# Patient Record
Sex: FEMALE | URBAN - METROPOLITAN AREA
[De-identification: names, ages, dates, MRNs, and addresses within clinical notes are randomized per-mention and may not be internally consistent; named-entity substitution may affect disease eponyms.]

---

## 2024-02-06 LAB
EXTERNAL CHLAMYDIA SCREEN: NEGATIVE
EXTERNAL GONORRHEA SCREEN: NEGATIVE
EXTERNAL HEPATITIS B SURFACE ANTIGEN: NEGATIVE
EXTERNAL RUBELLA IGG QUANTITATION: POSITIVE
HIV 1/ 2 AG/AB SCREEN EXTERNAL: NEGATIVE

## 2024-05-30 LAB
GLUCOSE, 1 HR SCREEN, PREG EXTERNAL: 132 MG/DL
SYPHILIS TOTAL AB EXTERNAL: NEGATIVE

## 2024-06-16 ENCOUNTER — HOSPITAL ENCOUNTER (EMERGENCY)
Facility: HOSPITAL | Age: 24
Discharge: STILL ADMITTED OR ADMINISTRATIVE | End: 2024-06-16

## 2024-06-16 ENCOUNTER — HOSPITAL ENCOUNTER (OUTPATIENT)
Facility: HOSPITAL | Age: 24
Discharge: HOME | End: 2024-06-16
Attending: STUDENT IN AN ORGANIZED HEALTH CARE EDUCATION/TRAINING PROGRAM | Admitting: STUDENT IN AN ORGANIZED HEALTH CARE EDUCATION/TRAINING PROGRAM

## 2024-06-16 ENCOUNTER — HOSPITAL ENCOUNTER (OUTPATIENT)
Facility: HOSPITAL | Age: 24
End: 2024-06-16
Attending: STUDENT IN AN ORGANIZED HEALTH CARE EDUCATION/TRAINING PROGRAM | Admitting: STUDENT IN AN ORGANIZED HEALTH CARE EDUCATION/TRAINING PROGRAM

## 2024-06-16 VITALS
RESPIRATION RATE: 20 BRPM | BODY MASS INDEX: 29.16 KG/M2 | SYSTOLIC BLOOD PRESSURE: 102 MMHG | HEIGHT: 65 IN | WEIGHT: 175 LBS | DIASTOLIC BLOOD PRESSURE: 58 MMHG | OXYGEN SATURATION: 97 % | TEMPERATURE: 98.4 F | HEART RATE: 94 BPM

## 2024-06-16 LAB
ALBUMIN SERPL-MCNC: 3.2 G/DL (ref 3.5–5)
ALP BLD-CCNC: 123 U/L (ref 35–125)
ALT SERPL-CCNC: 21 U/L (ref 5–40)
ANION GAP SERPL CALC-SCNC: 11 MMOL/L
AST SERPL-CCNC: 40 U/L (ref 5–40)
BASOPHILS # BLD AUTO: 0.03 X10*3/UL (ref 0–0.1)
BASOPHILS NFR BLD AUTO: 0.2 %
BILIRUB SERPL-MCNC: 0.3 MG/DL (ref 0.1–1.2)
BILIRUBIN, POC: NEGATIVE
BLOOD URINE, POC: NEGATIVE
BUN SERPL-MCNC: 6 MG/DL (ref 8–25)
CALCIUM SERPL-MCNC: 8.7 MG/DL (ref 8.5–10.4)
CHLORIDE SERPL-SCNC: 102 MMOL/L (ref 97–107)
CLARITY, POC: CLEAR
CO2 SERPL-SCNC: 21 MMOL/L (ref 24–31)
COLOR, POC: YELLOW
CREAT SERPL-MCNC: 0.4 MG/DL (ref 0.4–1.6)
EGFRCR SERPLBLD CKD-EPI 2021: >90 ML/MIN/1.73M*2
EOSINOPHIL # BLD AUTO: 0.15 X10*3/UL (ref 0–0.7)
EOSINOPHIL NFR BLD AUTO: 1.1 %
ERYTHROCYTE [DISTWIDTH] IN BLOOD BY AUTOMATED COUNT: 12.6 % (ref 11.5–14.5)
GLUCOSE SERPL-MCNC: 100 MG/DL (ref 65–99)
GLUCOSE URINE, POC: NORMAL
HCT VFR BLD AUTO: 34.8 % (ref 36–46)
HGB BLD-MCNC: 11.6 G/DL (ref 12–16)
IMM GRANULOCYTES # BLD AUTO: 0.15 X10*3/UL (ref 0–0.7)
IMM GRANULOCYTES NFR BLD AUTO: 1.1 % (ref 0–0.9)
KETONES, POC: NEGATIVE
LEUKOCYTE EST, POC: NEGATIVE
LYMPHOCYTES # BLD AUTO: 1.99 X10*3/UL (ref 1.2–4.8)
LYMPHOCYTES NFR BLD AUTO: 13.9 %
MCH RBC QN AUTO: 29.5 PG (ref 26–34)
MCHC RBC AUTO-ENTMCNC: 33.3 G/DL (ref 32–36)
MCV RBC AUTO: 89 FL (ref 80–100)
MONOCYTES # BLD AUTO: 0.91 X10*3/UL (ref 0.1–1)
MONOCYTES NFR BLD AUTO: 6.4 %
NEUTROPHILS # BLD AUTO: 11.04 X10*3/UL (ref 1.2–7.7)
NEUTROPHILS NFR BLD AUTO: 77.3 %
NITRITE, POC: NEGATIVE
NRBC BLD-RTO: 0 /100 WBCS (ref 0–0)
PH, POC: 7
PLATELET # BLD AUTO: 270 X10*3/UL (ref 150–450)
POC APPEARANCE OF BODY FLUID: NORMAL
POTASSIUM SERPL-SCNC: 3.1 MMOL/L (ref 3.4–5.1)
PROT SERPL-MCNC: 6.4 G/DL (ref 5.9–7.9)
RBC # BLD AUTO: 3.93 X10*6/UL (ref 4–5.2)
SODIUM SERPL-SCNC: 134 MMOL/L (ref 133–145)
SPECIFIC GRAVITY, POC: 1.01
URINE PROTEIN, POC: NORMAL
UROBILINOGEN, POC: NEGATIVE
WBC # BLD AUTO: 14.3 X10*3/UL (ref 4.4–11.3)

## 2024-06-16 PROCEDURE — 99213 OFFICE O/P EST LOW 20 MIN: CPT

## 2024-06-16 PROCEDURE — 2500000005 HC RX 250 GENERAL PHARMACY W/O HCPCS: Performed by: STUDENT IN AN ORGANIZED HEALTH CARE EDUCATION/TRAINING PROGRAM

## 2024-06-16 PROCEDURE — 4500999001 HC ED NO CHARGE

## 2024-06-16 PROCEDURE — 85025 COMPLETE CBC W/AUTO DIFF WBC: CPT | Performed by: STUDENT IN AN ORGANIZED HEALTH CARE EDUCATION/TRAINING PROGRAM

## 2024-06-16 PROCEDURE — 80053 COMPREHEN METABOLIC PANEL: CPT | Performed by: STUDENT IN AN ORGANIZED HEALTH CARE EDUCATION/TRAINING PROGRAM

## 2024-06-16 PROCEDURE — 36415 COLL VENOUS BLD VENIPUNCTURE: CPT | Performed by: STUDENT IN AN ORGANIZED HEALTH CARE EDUCATION/TRAINING PROGRAM

## 2024-06-16 RX ORDER — ONDANSETRON HYDROCHLORIDE 2 MG/ML
4 INJECTION, SOLUTION INTRAVENOUS EVERY 6 HOURS PRN
Status: DISCONTINUED | OUTPATIENT
Start: 2024-06-16 | End: 2024-06-17 | Stop reason: HOSPADM

## 2024-06-16 RX ORDER — ONDANSETRON 4 MG/1
4 TABLET, FILM COATED ORAL EVERY 6 HOURS PRN
Status: DISCONTINUED | OUTPATIENT
Start: 2024-06-16 | End: 2024-06-17 | Stop reason: HOSPADM

## 2024-06-16 RX ORDER — LABETALOL HYDROCHLORIDE 5 MG/ML
20 INJECTION, SOLUTION INTRAVENOUS ONCE AS NEEDED
Status: DISCONTINUED | OUTPATIENT
Start: 2024-06-16 | End: 2024-06-17 | Stop reason: HOSPADM

## 2024-06-16 RX ORDER — SODIUM CHLORIDE, SODIUM LACTATE, POTASSIUM CHLORIDE, CALCIUM CHLORIDE 600; 310; 30; 20 MG/100ML; MG/100ML; MG/100ML; MG/100ML
125 INJECTION, SOLUTION INTRAVENOUS CONTINUOUS
Status: DISCONTINUED | OUTPATIENT
Start: 2024-06-16 | End: 2024-06-17 | Stop reason: HOSPADM

## 2024-06-16 RX ORDER — NIFEDIPINE 10 MG/1
10 CAPSULE ORAL ONCE AS NEEDED
Status: DISCONTINUED | OUTPATIENT
Start: 2024-06-16 | End: 2024-06-17 | Stop reason: HOSPADM

## 2024-06-16 RX ORDER — ONDANSETRON 4 MG/1
4 TABLET, FILM COATED ORAL EVERY 8 HOURS PRN
Status: DISCONTINUED | OUTPATIENT
Start: 2024-06-16 | End: 2024-06-17 | Stop reason: HOSPADM

## 2024-06-16 RX ORDER — LIDOCAINE HYDROCHLORIDE 10 MG/ML
0.5 INJECTION INFILTRATION; PERINEURAL ONCE AS NEEDED
Status: DISCONTINUED | OUTPATIENT
Start: 2024-06-16 | End: 2024-06-17 | Stop reason: HOSPADM

## 2024-06-16 RX ORDER — HYDRALAZINE HYDROCHLORIDE 20 MG/ML
5 INJECTION INTRAMUSCULAR; INTRAVENOUS ONCE AS NEEDED
Status: DISCONTINUED | OUTPATIENT
Start: 2024-06-16 | End: 2024-06-17 | Stop reason: HOSPADM

## 2024-06-16 RX ORDER — ACETAMINOPHEN 325 MG/1
975 TABLET ORAL ONCE
Status: COMPLETED | OUTPATIENT
Start: 2024-06-16 | End: 2024-06-16

## 2024-06-16 ASSESSMENT — PAIN DESCRIPTION - DESCRIPTORS
DESCRIPTORS: SHARP
DESCRIPTORS: SHARP

## 2024-06-16 ASSESSMENT — PAIN DESCRIPTION - LOCATION: LOCATION: ABDOMEN

## 2024-06-16 ASSESSMENT — PAIN SCALES - GENERAL
PAINLEVEL_OUTOF10: 3
PAINLEVEL_OUTOF10: 10 - WORST POSSIBLE PAIN

## 2024-06-16 ASSESSMENT — PAIN - FUNCTIONAL ASSESSMENT
PAIN_FUNCTIONAL_ASSESSMENT: 0-10
PAIN_FUNCTIONAL_ASSESSMENT: 0-10

## 2024-06-17 NOTE — H&P
Obstetrical Admission History and Physical     Kyung De La Garza is a 23 y.o. . At 30+3 presents c/o one hour of upper abd pain.    Chief Complaint: Abdominal Pain and Vaginal Bleeding    Assessment/Plan    Pt feeling better after zofran and tylenol po. Declined IVF.  Tolerating water po.  Reviewed labs with pt, pt desires discharge home.      Active Problems:  There are no active Hospital Problems.      Pregnancy Problems (from 26 to present)       No problems associated with this episode.          Subjective   Kyung Dodge is here complaining of upper abd pain that started about an hour ago.  Pt ate a cheeseburger for dinner, then had pain and emesis.   Good FM, no VB, no LOF, no lower abd cramping.  Pain is in upper abd and constant.  Reports uncomplicated pregnancy.  Was seen at [facility] and just transferred and had one visit with LOG     Obstetrical History   OB History    Para Term  AB Living   1             SAB IAB Ectopic Multiple Live Births                  # Outcome Date GA Lbr [place]/2nd Weight Sex Delivery Anes PTL Lv   1 Current                Past Medical History  No past medical history on file.     Past Surgical History   No past surgical history on file.    Social History  Social History     Tobacco Use    Smoking status: Not on file    Smokeless tobacco: Not on file   Substance Use Topics    Alcohol use: Not on file     Substance and Sexual Activity   Drug Use Not on file       Allergies  Patient has no known allergies.     Medications  Medications Prior to Admission   Medication Sig Dispense Refill Last Dose    prenatal 21/iron fu/folic acid (PRENATAL COMPLETE ORAL) Take by mouth.   2024       Objective    Last Vitals  Temp Pulse Resp BP MAP O2 Sat   36.9 °C (98.4 °F) 94 20 102/58   97 %     Physical Examination  GENERAL: Examination reveals a well developed, well nourished, gravid female in no acute distress. She is alert and cooperative.  ABDOMEN:   soft, gravid, fundus nontender, points to RUQ and LUQ at location of pain, mild TTP in upper abd, no rebound, no guarding  FHR is 130 , with Accelerations, and a Category I tracing.    [place] reading:  no ctx  CERVIX:  closed/long/high posterior firm  EXTREMITIES: no redness or tenderness in the calves or thighs, no edema    Lab Review  Lab Results   Component Value Date    WBC 14.3 (H) 06/16/2024    HGB 11.6 (L) 06/16/2024    HCT 34.8 (L) 06/16/2024     06/16/2024     Lab Results   Component Value Date    GLUCOSE 100 (H) 06/16/2024     06/16/2024    K 3.1 (L) 06/16/2024     06/16/2024    CO2 21 (L) 06/16/2024    ANIONGAP 11 06/16/2024    BUN 6 (L) 06/16/2024    CREATININE 0.40 06/16/2024    EGFR >90 06/16/2024    CALCIUM 8.7 06/16/2024    ALBUMIN 3.2 (L) 06/16/2024    PROT 6.4 06/16/2024    ALKPHOS 123 06/16/2024    ALT 21 06/16/2024    AST 40 06/16/2024    BILITOT 0.3 06/16/2024

## 2024-07-02 ENCOUNTER — TELEPHONE (OUTPATIENT)
Dept: OBSTETRICS AND GYNECOLOGY | Facility: HOSPITAL | Age: 24
End: 2024-07-02

## 2024-07-11 ENCOUNTER — ROUTINE PRENATAL (OUTPATIENT)
Dept: MATERNAL FETAL MEDICINE | Facility: HOSPITAL | Age: 24
End: 2024-07-11

## 2024-07-11 ENCOUNTER — CLINICAL SUPPORT (OUTPATIENT)
Dept: GENETICS | Facility: HOSPITAL | Age: 24
End: 2024-07-11

## 2024-07-11 ENCOUNTER — HOSPITAL ENCOUNTER (OUTPATIENT)
Dept: RADIOLOGY | Facility: HOSPITAL | Age: 24
Discharge: HOME | End: 2024-07-11

## 2024-07-11 VITALS
BODY MASS INDEX: 30.49 KG/M2 | DIASTOLIC BLOOD PRESSURE: 79 MMHG | WEIGHT: 183 LBS | SYSTOLIC BLOOD PRESSURE: 124 MMHG | HEIGHT: 65 IN

## 2024-07-11 DIAGNOSIS — O35.9XX0 SUSPECTED FETAL ANOMALY, ANTEPARTUM, SINGLE OR UNSPECIFIED FETUS (HHS-HCC): ICD-10-CM

## 2024-07-11 DIAGNOSIS — O35.AXX0: ICD-10-CM

## 2024-07-11 DIAGNOSIS — O35.AXX0 FETAL CLEFT LIP AND PALATE AFFECTING ANTEPARTUM CARE OF MOTHER, SINGLE OR UNSPECIFIED FETUS (HHS-HCC): Primary | ICD-10-CM

## 2024-07-11 PROCEDURE — 99214 OFFICE O/P EST MOD 30 MIN: CPT | Performed by: MEDICAL GENETICS

## 2024-07-11 PROCEDURE — 76819 FETAL BIOPHYS PROFIL W/O NST: CPT

## 2024-07-11 PROCEDURE — 76811 OB US DETAILED SNGL FETUS: CPT

## 2024-07-11 PROCEDURE — 99204 OFFICE O/P NEW MOD 45 MIN: CPT | Performed by: MEDICAL GENETICS

## 2024-07-11 PROCEDURE — 76817 TRANSVAGINAL US OBSTETRIC: CPT

## 2024-07-11 PROCEDURE — 76811 OB US DETAILED SNGL FETUS: CPT | Performed by: MEDICAL GENETICS

## 2024-07-11 PROCEDURE — 76819 FETAL BIOPHYS PROFIL W/O NST: CPT | Performed by: MEDICAL GENETICS

## 2024-07-11 PROCEDURE — 76817 TRANSVAGINAL US OBSTETRIC: CPT | Performed by: MEDICAL GENETICS

## 2024-07-11 ASSESSMENT — PAIN SCALES - GENERAL: PAINLEVEL: 0-NO PAIN

## 2024-07-11 NOTE — LETTER
2024     Kyung De La Garza  [address]  [facility] [address]    Patient: Kyung De La Garza   YOB: 2000   Date of Visit: 2024       Dear Kyung De La Garza:    Thank you for coming to our [facility] for Melania's evaluation. Below are my notes for your records.   If you have questions, please do not hesitate to contact me.       Sincerely,     Dr. Janee Roland      CC: MD Ed Maldonado, MD Lynn Aguilar, MD Willa Hurst MD  ______________________________________________________________________________________    Patient's fetus found to have unilateral/left cleft lip on today's ultrasound.  This appears to be an incomplete cleft and unlikely to involve the palate, however, visualization of the palate was limited on today's exam.  She is a 23-year-old  1 para 0 at 34+ weeks gestation.  Prior ultrasound at an outside facility showed a probable cleft lip.  This is the patient's first ultrasound with our division. Prenatal aneuploidy screening was low risk via multiple marker screening (records reviewed).  Her  was present at the time of our visit and his name is Yogesh.  He is Indonesian-speaking.  They have named their baby girl Melania.    History reviewed and patient has the following risk factors: No risk factors identified and family history is unremarkable.  Please see genetic counseling note for additional details regarding the third generation pedigree.  Labs reviewed: Screen negative MMS; Prenatal labs within normal limits  Recommendations: First, a follow-up ultrasound in approximately 2 weeks will be performed within [facility] to reassess the fetal palate, outflow tracts and three-vessel view/3VT because these were not well-visualized today.  Second, a prenatal consultation with the pediatric craniofacial team will be scheduled via our office.  Third, a lactation specialist consultation  will be arranged also through our office.  Finally, the patient will decide on location of delivery.  I recommended delivery at [facility] in order to help coordinate  care as well as prevent separation of mom and baby in the setting of  complications.  However, delivery at [facility] is also an acceptable option and the patient will let us know which she and her  decide.  I also recommended induction of labor between 39 and 40 weeks due to fetal diagnosis and patient concerns about travel time to the hospital when in labor.    Patient Counseling: The patient was counseled about the ultrasound findings and fetal facial clefts in detail.  Our ultrasound showed appropriate fetal growth, normal amniotic fluid index, and normal anterior placenta.  A left cleft lip was identified that did not appear to extend to the nostril (probable incomplete cleft lip).  This cleft was narrow in its width and best viewed on cine loop.  No other fetal abnormalities were identified.  However, visualization of the face, mandible/maxilla, profile, outflow tracts, and three-vessel view/3VT were limited due to fetal position and advanced gestational age.    Orofacial clefts, including cleft lip (CL), cleft palate (CP), and CL with palate (CL/P), are relatively common congenital birth defects occurring in approximately 1 in 500 to 1 in 2500 live births. Detecting OFCs during prenatal ultrasound screening is crucial for informed decision-making and allowing multidisciplinary medical care including through our fetal care clinic at .      In terms of genetic associations, facial clefts as a group have a diverse genetic background influenced by gene-gene and gene-environment interaction, resulting in two main types, syndromic and nonsyndromic orofacial clefts.  Disruption early on during embryogenesis, between 4 to 12 weeks, could result in the failure of the proper fusion of the facial prominences, resulting in congenital  "anomalies such as oral clefts.  This disruption may be related to a primary genetic diagnosis (aka \"syndromic\" such as a chromosomal aneuploidy or single gene disorder) or may reflect the more common multifactorial-type of genetics (non-syndromic).  Non-syndromic cases are typically less complicated and associated with better prognoses.  I counseled the patient that an isolated unilateral cleft lip typically is not syndromic and that additional prenatal genetic screening or testing is not recommended at this time or gestational age.  A genetics evaluation can be reconsidered after birth if there are additional  findings or concerns.    Orofacial clefts may lead to significant physiological difficulties that affect feeding, speech and language development and other developmental aspects, however, this is less likely with an incomplete and unilateral cleft lip as in her baby's case.  We discussed that there may or may not be feeding issues related to the cleft lip and the patient requested a referral to lactation services.  The patient expressed that her priority is to provide her baby with her breast milk after delivery.      In terms of surgery, closure of the cleft is typically performed at 3-6 months.  If there is palatal defect, this is typically repaired at 9+ months.  I recommended that the patient have a prenatal consultation with our [place] [facility] for more specific details and counseling regarding her baby's cleft lip.  We will also have her return in 2 weeks to see if the fetal position has changed to allow us to better visualize the palate.  I did  the patient that an incomplete, small, unilateral cleft lip is less likely to be associated with a significant palatal defect.    Overall, the patient appeared to have a good understanding of what we discussed and asked appropriate questions.  Thank you for allowing us to participate in your patient's care.    Dr. Janee Roland - " Director of  Genetics & OB Imaging at [facility]

## 2024-07-11 NOTE — LETTER
2024     Milagro Chavez MD  [address] [place] [address]  [address]  [place] [address]    Patient: Kyung De La Garza   YOB: 2000   Date of Visit: 2024       Dear Dr. Milagro Chavez MD:    Thank you for referring Kyung De La Garza to me for evaluation. Below are my notes for this consultation.  If you have questions, please do not hesitate to call me. I look forward to following your patient along with you.       Sincerely,     [facility] [address] [facility] FET[facility]      CC: Aidee Rapp, APRN-CN, DNP  ______________________________________________________________________________________    Patient's fetus found to have unilateral/left cleft lip on today's ultrasound.  This appears to be an incomplete cleft and unlikely to involve the palate, however, visualization of the palate was limited on today's exam.  She is a 23-year-old  1 para 0 at 34+ weeks gestation.  Prior ultrasound at an outside facility showed a probable cleft lip.  This is the patient's first ultrasound with our division. Prenatal aneuploidy screening was low risk via multiple marker screening (records reviewed).  Her  was present at the time of our visit and his name is Yogesh.  He is Malian-speaking.  They have named their baby girl Melania.    History reviewed and patient has the following risk factors: BMI of 30; Otherwise No other risk factors identified and family history is unremarkable.  Please see genetic counseling note for additional details regarding the third generation pedigree.  Labs reviewed: Screen negative MMS; Prenatal labs within normal limits  Recommendations: First, a follow-up ultrasound in approximately 2 weeks will be performed within [facility] to reassess the fetal palate, outflow tracts and three-vessel view/3VT because these were not well-visualized today.  Second, a prenatal consultation with the pediatric craniofacial team will be scheduled via our office.  Third, a  lactation specialist consultation will be arranged also through our office.  Finally, the patient will decide on location of delivery.  I recommended delivery at [facility] in order to help coordinate  care as well as prevent separation of mom and baby in the setting of  complications.  However, delivery at [facility] is also an acceptable option and the patient will let us know which she and her  decide.  I also recommended induction of labor between 39 and 40 weeks due to fetal diagnosis and patient concerns about travel time to the hospital when in labor.    Patient Counseling: The patient was counseled about the ultrasound findings and fetal facial clefts in detail.  Our ultrasound showed appropriate fetal growth, normal amniotic fluid index, and normal anterior placenta.  A left cleft lip was identified that did not appear to extend to the nostril (probable incomplete cleft lip).  This cleft was narrow in its width and best viewed on cine loop.  No other fetal abnormalities were identified.  However, visualization of the face, mandible/maxilla, profile, outflow tracts, and three-vessel view/3VT were limited due to fetal position and advanced gestational age.    Orofacial clefts, including cleft lip (CL), cleft palate (CP), and CL with palate (CL/P), are relatively common congenital birth defects occurring in approximately 1 in 500 to 1 in 2500 live births. Detecting OFCs during prenatal ultrasound screening is crucial for informed decision-making and allowing multidisciplinary medical care including through our fetal care clinic at .      In terms of genetic associations, facial clefts as a group have a diverse genetic background influenced by gene-gene and gene-environment interaction, resulting in two main types, syndromic and nonsyndromic orofacial clefts.  Disruption early on during embryogenesis, between 4 to 12 weeks, could result in the failure of the proper fusion of the facial  "prominences, resulting in congenital anomalies such as oral clefts.  This disruption may be related to a primary genetic diagnosis (aka \"syndromic\" such as a chromosomal aneuploidy or single gene disorder) or may reflect the more common multifactorial-type of genetics (non-syndromic).  I counseled the patient that an isolated unilateral cleft lip typically is not syndromic and that additional prenatal genetic screening or testing is not recommended at this time or gestational age.  A genetics evaluation can be reconsidered after birth if there are additional  findings or concerns.    Orofacial clefts lead to significant physiological difficulties that affect feeding, speech and language development and other developmental aspects, however, this is less likely with an incomplete and unilateral cleft lip as in her baby's case.  We discussed that there may or may not be feeding issues related to the cleft lip and the patient requested a referral to lactation services.  The patient expressed that her priority is to provide her baby with her breast milk after delivery.  In terms of surgery, closure of the cleft is typically performed at 3 to 6 months.  If there is palatal defect, this is typically repaired at 9+ months.  I recommended that the patient have a prenatal consultation with our [place] [facility] for more specific details about surgical management regarding her baby's cleft lip.  We will also have her return in 2 weeks to see if the fetal position has changed to allow us to better visualize the palate.  I did  the patient that an incomplete, small, unilateral cleft lip is less likely to be associated with a significant palatal defect.    Overall, the patient appeared to have a good understanding of what we discussed and asked appropriate questions.  Thank you for allowing us to participate in your patient's care.    Dr. Janee Roland - Director of  Genetics & OB Imaging at " [facility]    I spent 45 minutes with the patient in counseling, consideration of the fetal diagnosis and coordination of care.  This time was independent of the ultrasound time.

## 2024-07-12 NOTE — PROGRESS NOTES
Patient's fetus found to have unilateral/left cleft lip on today's ultrasound.  This appears to be an incomplete cleft and unlikely to involve the palate, however, visualization of the palate was limited on today's exam.  She is a 23-year-old  1 para 0 at 34+ weeks gestation.  Prior ultrasound at an outside facility showed a probable cleft lip.  This is the patient's first ultrasound with our division. Prenatal aneuploidy screening was low risk via multiple marker screening (records reviewed).  Her  was present at the time of our visit and his name is Yogesh.  He is Estonian-speaking.  They have named their baby girl Melania.    History reviewed and patient has the following risk factors: BMI of 30; Otherwise No other risk factors identified and family history is unremarkable.  Please see genetic counseling note for additional details regarding the third generation pedigree.  Labs reviewed: Screen negative MMS; Prenatal labs within normal limits  Recommendations: First, a follow-up ultrasound in approximately 2 weeks will be performed within [facility] to reassess the fetal palate, outflow tracts and three-vessel view/3VT because these were not well-visualized today.  Second, a prenatal consultation with the pediatric craniofacial team will be scheduled via our office.  Third, a lactation specialist consultation will be arranged also through our office.  Finally, the patient will decide on location of delivery.  I recommended delivery at [facility] in order to help coordinate  care as well as prevent separation of mom and baby in the setting of  complications.  However, delivery at [facility] is also an acceptable option and the patient will let us know which she and her  decide.  I also recommended induction of labor between 39 and 40 weeks due to fetal diagnosis and patient concerns about travel time to the hospital when in labor.    Patient Counseling: The patient was counseled  "about the ultrasound findings and fetal facial clefts in detail.  Our ultrasound showed appropriate fetal growth, normal amniotic fluid index, and normal anterior placenta.  A left cleft lip was identified that did not appear to extend to the nostril (probable incomplete cleft lip).  This cleft was narrow in its width and best viewed on cine loop.  No other fetal abnormalities were identified.  However, visualization of the face, mandible/maxilla, profile, outflow tracts, and three-vessel view/3VT were limited due to fetal position and advanced gestational age.    Orofacial clefts, including cleft lip (CL), cleft palate (CP), and CL with palate (CL/P), are relatively common congenital birth defects occurring in approximately 1 in 500 to 1 in 2500 live births. Detecting OFCs during prenatal ultrasound screening is crucial for informed decision-making and allowing multidisciplinary medical care including through our fetal care clinic at .      In terms of genetic associations, facial clefts as a group have a diverse genetic background influenced by gene-gene and gene-environment interaction, resulting in two main types, syndromic and nonsyndromic orofacial clefts.  Disruption early on during embryogenesis, between 4 to 12 weeks, could result in the failure of the proper fusion of the facial prominences, resulting in congenital anomalies such as oral clefts.  This disruption may be related to a primary genetic diagnosis (aka \"syndromic\" such as a chromosomal aneuploidy or single gene disorder) or may reflect the more common multifactorial-type of genetics (non-syndromic).  I counseled the patient that an isolated unilateral cleft lip typically is not syndromic and that additional prenatal genetic screening or testing is not recommended at this time or gestational age.  A genetics evaluation can be reconsidered after birth if there are additional  findings or concerns.    Orofacial clefts lead to significant " physiological difficulties that affect feeding, speech and language development and other developmental aspects, however, this is less likely with an incomplete and unilateral cleft lip as in her baby's case.  We discussed that there may or may not be feeding issues related to the cleft lip and the patient requested a referral to lactation services.  The patient expressed that her priority is to provide her baby with her breast milk after delivery.  In terms of surgery, closure of the cleft is typically performed at 3-6 months.  If there is palatal defect, this is typically repaired at 9+ months.  I recommended that the patient have a prenatal consultation with our [place] [facility] for more specific details regarding surgical management and counseling regarding her baby's cleft lip.  We will also have her return in 2 weeks to see if the fetal position has changed to allow us to better visualize the palate.  I did  the patient that an incomplete, small, unilateral cleft lip is less likely to be associated with a significant palatal defect.    Overall, the patient appeared to have a good understanding of what we discussed and asked appropriate questions.  Thank you for allowing us to participate in your patient's care.    Dr. Janee Roland - Director of  Genetics & OB Imaging at [facility]    I spent 45 minutes with the patient in counseling, consideration of the fetal diagnosis and coordination of care.  This time was independent of the ultrasound time.

## 2024-07-12 NOTE — PROGRESS NOTES
Thank you for the referral of Kyung De La Garza.  She is a 23 year old, , female who was 35 weeks pregnant at the time of our appointment with an EDC of 2024.  She was seen for genetic counseling with her partner due to fetal cleft lip.        PAST HISTORY:  Patient initially had her prenatal care through [facility] and transferred care to [facility] at 29 weeks gestation. Records indicate that she had negative maternal serum Quad screening (although her personal risk for Down syndrome increased from her age related risk of 1 in 1130 to 1 in 1088 following the screening test) and incomplete fetal anatomy ultrasound (suboptimal views of face and cord). Following transfer of care, follow up ultrasound performed at [facility] indicated concern for possible cleft lip. Ultrasound performed in our institution prior to genetic counseling indicates unilateral left incomplete cleft lip.     FAMILY HISTORY  Medical and family histories were reviewed and the following concerns were reported:    Patient   -Reports that she, all 4 of her sisters, and her mother all have flat nipples and that her mother had difficulties breast feeding; patient herself concerned about how this will impact her ability to breast feed and that her goal is to at least provide breast milk to baby (concerned about whether she can even pump).        Patient's reproductive partner  -Age 24; no reported family history concerns    The remainder of the family history was negative for birth defects, intellectual disability, recurrent pregnancy loss, or recognized inherited conditions.  Consanguinity denied.          SELF REPORTED RACE/ETHNICITY:  Patient: [place] (speaks fluent English and Bruneian)  Patient's partner: [place] (speaks Bruneian only)     COUNSELING:    The following information was discussed with your patient:    1. The general population risk of 3-5% for birth defects in every pregnancy.    2. Cleft lip and/or palate  can be isolated and multifactorial or due to underlying genetic condition which may be a single gene or chromosomal disorder. About 15-30% of individuals with CLP have additional features that may be part of an identifiable genetic syndrome (no additional findings noted on ultrasound at this time). The risk for chromosome abnormality is approximately 10%. Suspicion for chromosome abnormality is increased in the context of other ultrasound study findings (none appreciated in this case). Single gene causes of syndromic or isolated familial clefting include pathogenic variants in IRF6, GRHL3, LRP6, TBX1, and TP63 genes .     3. Isolated cleft lip and/or palate is typically thought to be due to a combination of genetic and environmental factors and has a recurrence risk in future pregnancies of approximately 3-5%. Clefting can also be due to certain teratogenic exposures. If cleft lip or palate is associated with underlying genetic syndrome, risk for recurrence depends on the specific cause.     4. There are many different chromosome abnormalities that may involve clefting, including trisomy 13 and 22q11.2 deletion syndrome. This finding may also be associated with single gene disorders, such as Van tiara Woude.      5. The methods, benefits, limitations, and results of the patient's maternal serum screening. Normal screening results do not rule out the possibility of Down syndrome or 18 in the fetus.      6. While one cannot rule out the possibility of all genetic conditions prenatally, presently there are no additional findings to suggest a specific chromosome abnormality or single gene condition.       7. Orofacial clefting typically requires surgical repair in the  period. Feeding difficulties are also common in these newborns. Prenatal consulation with our pediatric craniofacial team is recommended and can be arranged by our fetal care coordinator, Sugey Rapp, if desired.      8. Additional genetic testing may  be considered prenatally via cfDNA screening (MaterniTGenome screen) or diagnostic testing via amniocentesis. Benefits and limitations of these options discussed.  genetic testing may also be considered if there is suspicion for genetic etiology.     9. The availability of diagnostic fetal chromosome analysis, as well as comprehensive whole genome sequencing via amniocentesis. The methods, benefits, limitations and risks of amniocentesis and a 1 in 400 risk of complications, including a 1 in 800 risk of  delivery.    10. Carrier testing was not addressed today.     DISPOSITION:  The patient stated that she understood the above information and declined to proceed with any prenatal genetic testing at this time. She and her partner are considering transfer of care for delivery at [facility]; prenatal craniofacial consultation declined. Patient is open to  genetic testing if there are additional concerns suggestive of syndromic etiology after delivery.            Thank you for allowing us to participate in the care of your patient.  Should you or your patient have any questions, please do not hesitate to contact our office at [phone].      Sincerely,      Kerline Martin MS  Licensed Genetic Counselor

## 2024-07-25 ENCOUNTER — APPOINTMENT (OUTPATIENT)
Dept: RADIOLOGY | Facility: HOSPITAL | Age: 24
End: 2024-07-25

## 2024-07-25 ENCOUNTER — HOSPITAL ENCOUNTER (OUTPATIENT)
Dept: RADIOLOGY | Facility: HOSPITAL | Age: 24
Discharge: HOME | End: 2024-07-25

## 2024-07-25 DIAGNOSIS — Z3A.36 36 WEEKS GESTATION OF PREGNANCY (HHS-HCC): Primary | ICD-10-CM

## 2024-07-25 DIAGNOSIS — O35.9XX0 SUSPECTED FETAL ANOMALY, ANTEPARTUM, SINGLE OR UNSPECIFIED FETUS (HHS-HCC): ICD-10-CM

## 2024-07-25 PROCEDURE — 87081 CULTURE SCREEN ONLY: CPT | Performed by: STUDENT IN AN ORGANIZED HEALTH CARE EDUCATION/TRAINING PROGRAM

## 2024-07-25 PROCEDURE — 76816 OB US FOLLOW-UP PER FETUS: CPT

## 2024-07-25 PROCEDURE — 76818 FETAL BIOPHYS PROFILE W/NST: CPT

## 2024-07-28 LAB — GP B STREP GENITAL QL CULT: NORMAL

## 2024-08-15 ENCOUNTER — ANESTHESIA EVENT (OUTPATIENT)
Dept: OBSTETRICS AND GYNECOLOGY | Facility: HOSPITAL | Age: 24
End: 2024-08-15

## 2024-08-15 ENCOUNTER — ANESTHESIA (OUTPATIENT)
Dept: OBSTETRICS AND GYNECOLOGY | Facility: HOSPITAL | Age: 24
End: 2024-08-15

## 2024-08-15 ENCOUNTER — APPOINTMENT (OUTPATIENT)
Dept: OBSTETRICS AND GYNECOLOGY | Facility: HOSPITAL | Age: 24
End: 2024-08-15

## 2024-08-15 ENCOUNTER — HOSPITAL ENCOUNTER (INPATIENT)
Facility: HOSPITAL | Age: 24
LOS: 3 days | Discharge: HOME | End: 2024-08-18
Attending: OBSTETRICS & GYNECOLOGY | Admitting: SPECIALIST

## 2024-08-15 DIAGNOSIS — O35.AXX0 FETAL CLEFT LIP AND PALATE AFFECTING ANTEPARTUM CARE OF MOTHER, SINGLE OR UNSPECIFIED FETUS (HHS-HCC): ICD-10-CM

## 2024-08-15 PROBLEM — J45.20 MILD INTERMITTENT ASTHMA (HHS-HCC): Status: ACTIVE | Noted: 2024-08-15

## 2024-08-15 LAB
ABO GROUP (TYPE) IN BLOOD: NORMAL
ANTIBODY SCREEN: NORMAL
ERYTHROCYTE [DISTWIDTH] IN BLOOD BY AUTOMATED COUNT: 13.5 % (ref 11.5–14.5)
HCT VFR BLD AUTO: 36.8 % (ref 36–46)
HGB BLD-MCNC: 12.5 G/DL (ref 12–16)
MCH RBC QN AUTO: 29.6 PG (ref 26–34)
MCHC RBC AUTO-ENTMCNC: 34 G/DL (ref 32–36)
MCV RBC AUTO: 87 FL (ref 80–100)
NRBC BLD-RTO: 0 /100 WBCS (ref 0–0)
PLATELET # BLD AUTO: 257 X10*3/UL (ref 150–450)
RBC # BLD AUTO: 4.23 X10*6/UL (ref 4–5.2)
RH FACTOR (ANTIGEN D): NORMAL
TREPONEMA PALLIDUM IGG+IGM AB [PRESENCE] IN SERUM OR PLASMA BY IMMUNOASSAY: NONREACTIVE
WBC # BLD AUTO: 10.1 X10*3/UL (ref 4.4–11.3)

## 2024-08-15 PROCEDURE — 3E0P7VZ INTRODUCTION OF HORMONE INTO FEMALE REPRODUCTIVE, VIA NATURAL OR ARTIFICIAL OPENING: ICD-10-PCS | Performed by: OBSTETRICS & GYNECOLOGY

## 2024-08-15 PROCEDURE — 2500000001 HC RX 250 WO HCPCS SELF ADMINISTERED DRUGS (ALT 637 FOR MEDICARE OP)

## 2024-08-15 PROCEDURE — 51701 INSERT BLADDER CATHETER: CPT

## 2024-08-15 PROCEDURE — 36415 COLL VENOUS BLD VENIPUNCTURE: CPT

## 2024-08-15 PROCEDURE — 86780 TREPONEMA PALLIDUM: CPT

## 2024-08-15 PROCEDURE — 86901 BLOOD TYPING SEROLOGIC RH(D): CPT

## 2024-08-15 PROCEDURE — 1120000001 HC OB PRIVATE ROOM DAILY

## 2024-08-15 PROCEDURE — 59050 FETAL MONITOR W/REPORT: CPT

## 2024-08-15 PROCEDURE — 2500000005 HC RX 250 GENERAL PHARMACY W/O HCPCS: Performed by: ANESTHESIOLOGIST ASSISTANT

## 2024-08-15 PROCEDURE — 10907ZC DRAINAGE OF AMNIOTIC FLUID, THERAPEUTIC FROM PRODUCTS OF CONCEPTION, VIA NATURAL OR ARTIFICIAL OPENING: ICD-10-PCS | Performed by: OBSTETRICS & GYNECOLOGY

## 2024-08-15 PROCEDURE — 85027 COMPLETE CBC AUTOMATED: CPT

## 2024-08-15 PROCEDURE — 94760 N-INVAS EAR/PLS OXIMETRY 1: CPT

## 2024-08-15 PROCEDURE — 7210000002 HC LABOR PER HOUR

## 2024-08-15 PROCEDURE — 2500000004 HC RX 250 GENERAL PHARMACY W/ HCPCS (ALT 636 FOR OP/ED)

## 2024-08-15 PROCEDURE — 2500000005 HC RX 250 GENERAL PHARMACY W/O HCPCS: Performed by: ANESTHESIOLOGY

## 2024-08-15 PROCEDURE — 2500000004 HC RX 250 GENERAL PHARMACY W/ HCPCS (ALT 636 FOR OP/ED): Performed by: STUDENT IN AN ORGANIZED HEALTH CARE EDUCATION/TRAINING PROGRAM

## 2024-08-15 PROCEDURE — 86850 RBC ANTIBODY SCREEN: CPT

## 2024-08-15 PROCEDURE — 3700000014 EPIDURAL BLOCK: Performed by: ANESTHESIOLOGY

## 2024-08-15 RX ORDER — METOCLOPRAMIDE 10 MG/1
10 TABLET ORAL EVERY 6 HOURS PRN
Status: DISCONTINUED | OUTPATIENT
Start: 2024-08-15 | End: 2024-08-16

## 2024-08-15 RX ORDER — ONDANSETRON HYDROCHLORIDE 2 MG/ML
4 INJECTION, SOLUTION INTRAVENOUS EVERY 6 HOURS PRN
Status: DISCONTINUED | OUTPATIENT
Start: 2024-08-15 | End: 2024-08-16

## 2024-08-15 RX ORDER — METOCLOPRAMIDE 10 MG/1
10 TABLET ORAL EVERY 6 HOURS PRN
Status: CANCELLED | OUTPATIENT
Start: 2024-08-15

## 2024-08-15 RX ORDER — OXYTOCIN/0.9 % SODIUM CHLORIDE 30/500 ML
60 PLASTIC BAG, INJECTION (ML) INTRAVENOUS ONCE AS NEEDED
Status: COMPLETED | OUTPATIENT
Start: 2024-08-15 | End: 2024-08-16

## 2024-08-15 RX ORDER — OXYTOCIN 10 [USP'U]/ML
10 INJECTION, SOLUTION INTRAMUSCULAR; INTRAVENOUS ONCE AS NEEDED
Status: DISCONTINUED | OUTPATIENT
Start: 2024-08-15 | End: 2024-08-16 | Stop reason: HOSPADM

## 2024-08-15 RX ORDER — LIDOCAINE HYDROCHLORIDE 10 MG/ML
30 INJECTION INFILTRATION; PERINEURAL ONCE AS NEEDED
Status: CANCELLED | OUTPATIENT
Start: 2024-08-15

## 2024-08-15 RX ORDER — HYDRALAZINE HYDROCHLORIDE 20 MG/ML
5 INJECTION INTRAMUSCULAR; INTRAVENOUS ONCE AS NEEDED
Status: DISCONTINUED | OUTPATIENT
Start: 2024-08-15 | End: 2024-08-16 | Stop reason: HOSPADM

## 2024-08-15 RX ORDER — SODIUM CHLORIDE, SODIUM LACTATE, POTASSIUM CHLORIDE, CALCIUM CHLORIDE 600; 310; 30; 20 MG/100ML; MG/100ML; MG/100ML; MG/100ML
125 INJECTION, SOLUTION INTRAVENOUS CONTINUOUS
Status: CANCELLED | OUTPATIENT
Start: 2024-08-15

## 2024-08-15 RX ORDER — OXYTOCIN 10 [USP'U]/ML
10 INJECTION, SOLUTION INTRAMUSCULAR; INTRAVENOUS ONCE AS NEEDED
Status: CANCELLED | OUTPATIENT
Start: 2024-08-15

## 2024-08-15 RX ORDER — OXYTOCIN/0.9 % SODIUM CHLORIDE 30/500 ML
2-30 PLASTIC BAG, INJECTION (ML) INTRAVENOUS CONTINUOUS
Status: DISCONTINUED | OUTPATIENT
Start: 2024-08-15 | End: 2024-08-15

## 2024-08-15 RX ORDER — METOCLOPRAMIDE HYDROCHLORIDE 5 MG/ML
10 INJECTION INTRAMUSCULAR; INTRAVENOUS EVERY 6 HOURS PRN
Status: CANCELLED | OUTPATIENT
Start: 2024-08-15

## 2024-08-15 RX ORDER — FENTANYL/BUPIVACAINE/NS/PF 2MCG/ML-.1
PLASTIC BAG, INJECTION (ML) INJECTION AS NEEDED
Status: DISCONTINUED | OUTPATIENT
Start: 2024-08-15 | End: 2024-08-16

## 2024-08-15 RX ORDER — OXYTOCIN/0.9 % SODIUM CHLORIDE 30/500 ML
60 PLASTIC BAG, INJECTION (ML) INTRAVENOUS ONCE AS NEEDED
Status: CANCELLED | OUTPATIENT
Start: 2024-08-15

## 2024-08-15 RX ORDER — MISOPROSTOL 200 UG/1
800 TABLET ORAL ONCE AS NEEDED
Status: CANCELLED | OUTPATIENT
Start: 2024-08-15

## 2024-08-15 RX ORDER — METHYLERGONOVINE MALEATE 0.2 MG/ML
0.2 INJECTION INTRAVENOUS ONCE AS NEEDED
Status: COMPLETED | OUTPATIENT
Start: 2024-08-15 | End: 2024-08-16

## 2024-08-15 RX ORDER — NIFEDIPINE 10 MG/1
10 CAPSULE ORAL ONCE AS NEEDED
Status: DISCONTINUED | OUTPATIENT
Start: 2024-08-15 | End: 2024-08-16 | Stop reason: HOSPADM

## 2024-08-15 RX ORDER — MISOPROSTOL 200 UG/1
800 TABLET ORAL ONCE AS NEEDED
Status: DISCONTINUED | OUTPATIENT
Start: 2024-08-15 | End: 2024-08-16 | Stop reason: HOSPADM

## 2024-08-15 RX ORDER — ONDANSETRON 4 MG/1
4 TABLET, FILM COATED ORAL EVERY 6 HOURS PRN
Status: DISCONTINUED | OUTPATIENT
Start: 2024-08-15 | End: 2024-08-16

## 2024-08-15 RX ORDER — LABETALOL HYDROCHLORIDE 5 MG/ML
20 INJECTION, SOLUTION INTRAVENOUS ONCE AS NEEDED
Status: DISCONTINUED | OUTPATIENT
Start: 2024-08-15 | End: 2024-08-16 | Stop reason: HOSPADM

## 2024-08-15 RX ORDER — LIDOCAINE HYDROCHLORIDE 10 MG/ML
30 INJECTION INFILTRATION; PERINEURAL ONCE AS NEEDED
Status: DISCONTINUED | OUTPATIENT
Start: 2024-08-15 | End: 2024-08-16 | Stop reason: HOSPADM

## 2024-08-15 RX ORDER — LOPERAMIDE HYDROCHLORIDE 2 MG/1
4 CAPSULE ORAL EVERY 2 HOUR PRN
Status: DISCONTINUED | OUTPATIENT
Start: 2024-08-15 | End: 2024-08-16 | Stop reason: HOSPADM

## 2024-08-15 RX ORDER — CARBOPROST TROMETHAMINE 250 UG/ML
250 INJECTION, SOLUTION INTRAMUSCULAR ONCE AS NEEDED
Status: CANCELLED | OUTPATIENT
Start: 2024-08-15

## 2024-08-15 RX ORDER — HYDRALAZINE HYDROCHLORIDE 20 MG/ML
5 INJECTION INTRAMUSCULAR; INTRAVENOUS ONCE AS NEEDED
Status: CANCELLED | OUTPATIENT
Start: 2024-08-15

## 2024-08-15 RX ORDER — NIFEDIPINE 10 MG/1
10 CAPSULE ORAL ONCE AS NEEDED
Status: CANCELLED | OUTPATIENT
Start: 2024-08-15

## 2024-08-15 RX ORDER — OXYTOCIN/0.9 % SODIUM CHLORIDE 30/500 ML
2-30 PLASTIC BAG, INJECTION (ML) INTRAVENOUS CONTINUOUS
Status: DISCONTINUED | OUTPATIENT
Start: 2024-08-15 | End: 2024-08-16

## 2024-08-15 RX ORDER — TERBUTALINE SULFATE 1 MG/ML
0.25 INJECTION SUBCUTANEOUS ONCE AS NEEDED
Status: CANCELLED | OUTPATIENT
Start: 2024-08-15

## 2024-08-15 RX ORDER — LOPERAMIDE HYDROCHLORIDE 2 MG/1
4 CAPSULE ORAL EVERY 2 HOUR PRN
Status: CANCELLED | OUTPATIENT
Start: 2024-08-15

## 2024-08-15 RX ORDER — ONDANSETRON HYDROCHLORIDE 2 MG/ML
4 INJECTION, SOLUTION INTRAVENOUS EVERY 6 HOURS PRN
Status: CANCELLED | OUTPATIENT
Start: 2024-08-15

## 2024-08-15 RX ORDER — LABETALOL HYDROCHLORIDE 5 MG/ML
20 INJECTION, SOLUTION INTRAVENOUS ONCE AS NEEDED
Status: CANCELLED | OUTPATIENT
Start: 2024-08-15

## 2024-08-15 RX ORDER — ONDANSETRON 4 MG/1
4 TABLET, FILM COATED ORAL EVERY 6 HOURS PRN
Status: CANCELLED | OUTPATIENT
Start: 2024-08-15

## 2024-08-15 RX ORDER — METOCLOPRAMIDE HYDROCHLORIDE 5 MG/ML
10 INJECTION INTRAMUSCULAR; INTRAVENOUS EVERY 6 HOURS PRN
Status: DISCONTINUED | OUTPATIENT
Start: 2024-08-15 | End: 2024-08-16

## 2024-08-15 RX ORDER — TERBUTALINE SULFATE 1 MG/ML
0.25 INJECTION SUBCUTANEOUS ONCE AS NEEDED
Status: DISCONTINUED | OUTPATIENT
Start: 2024-08-15 | End: 2024-08-16 | Stop reason: HOSPADM

## 2024-08-15 RX ORDER — MORPHINE SULFATE 2 MG/ML
2 INJECTION, SOLUTION INTRAMUSCULAR; INTRAVENOUS ONCE
Status: COMPLETED | OUTPATIENT
Start: 2024-08-15 | End: 2024-08-15

## 2024-08-15 RX ORDER — FENTANYL/BUPIVACAINE/NS/PF 2MCG/ML-.1
0-54 PLASTIC BAG, INJECTION (ML) INJECTION CONTINUOUS
Status: DISCONTINUED | OUTPATIENT
Start: 2024-08-15 | End: 2024-08-16

## 2024-08-15 RX ORDER — TRANEXAMIC ACID 100 MG/ML
1000 INJECTION, SOLUTION INTRAVENOUS ONCE AS NEEDED
Status: DISCONTINUED | OUTPATIENT
Start: 2024-08-15 | End: 2024-08-16 | Stop reason: HOSPADM

## 2024-08-15 RX ORDER — METHYLERGONOVINE MALEATE 0.2 MG/ML
0.2 INJECTION INTRAVENOUS ONCE AS NEEDED
Status: CANCELLED | OUTPATIENT
Start: 2024-08-15

## 2024-08-15 RX ORDER — CARBOPROST TROMETHAMINE 250 UG/ML
250 INJECTION, SOLUTION INTRAMUSCULAR ONCE AS NEEDED
Status: DISCONTINUED | OUTPATIENT
Start: 2024-08-15 | End: 2024-08-16 | Stop reason: HOSPADM

## 2024-08-15 RX ORDER — SODIUM CHLORIDE, SODIUM LACTATE, POTASSIUM CHLORIDE, CALCIUM CHLORIDE 600; 310; 30; 20 MG/100ML; MG/100ML; MG/100ML; MG/100ML
125 INJECTION, SOLUTION INTRAVENOUS CONTINUOUS
Status: DISCONTINUED | OUTPATIENT
Start: 2024-08-15 | End: 2024-08-16

## 2024-08-15 RX ORDER — TRANEXAMIC ACID 100 MG/ML
1000 INJECTION, SOLUTION INTRAVENOUS ONCE AS NEEDED
Status: CANCELLED | OUTPATIENT
Start: 2024-08-15

## 2024-08-15 RX ADMIN — MISOPROSTOL 25 MCG: 100 TABLET ORAL at 13:58

## 2024-08-15 RX ADMIN — SODIUM CHLORIDE, POTASSIUM CHLORIDE, SODIUM LACTATE AND CALCIUM CHLORIDE 500 ML: 600; 310; 30; 20 INJECTION, SOLUTION INTRAVENOUS at 20:42

## 2024-08-15 RX ADMIN — MORPHINE SULFATE 2 MG: 2 INJECTION, SOLUTION INTRAMUSCULAR; INTRAVENOUS at 17:51

## 2024-08-15 RX ADMIN — SODIUM CHLORIDE, POTASSIUM CHLORIDE, SODIUM LACTATE AND CALCIUM CHLORIDE 125 ML/HR: 600; 310; 30; 20 INJECTION, SOLUTION INTRAVENOUS at 23:38

## 2024-08-15 RX ADMIN — MORPHINE SULFATE 2 MG: 2 INJECTION, SOLUTION INTRAMUSCULAR; INTRAVENOUS at 13:40

## 2024-08-15 RX ADMIN — SODIUM CHLORIDE, POTASSIUM CHLORIDE, SODIUM LACTATE AND CALCIUM CHLORIDE 125 ML/HR: 600; 310; 30; 20 INJECTION, SOLUTION INTRAVENOUS at 19:31

## 2024-08-15 RX ADMIN — SODIUM CHLORIDE, POTASSIUM CHLORIDE, SODIUM LACTATE AND CALCIUM CHLORIDE 125 ML/HR: 600; 310; 30; 20 INJECTION, SOLUTION INTRAVENOUS at 11:36

## 2024-08-15 SDOH — SOCIAL STABILITY: SOCIAL INSECURITY: ARE THERE ANY APPARENT SIGNS OF INJURIES/BEHAVIORS THAT COULD BE RELATED TO ABUSE/NEGLECT?: NO

## 2024-08-15 SDOH — HEALTH STABILITY: MENTAL HEALTH: WISH TO BE DEAD (PAST 1 MONTH): NO

## 2024-08-15 SDOH — SOCIAL STABILITY: SOCIAL INSECURITY: ABUSE SCREEN: ADULT

## 2024-08-15 SDOH — HEALTH STABILITY: MENTAL HEALTH: HAVE YOU USED ANY PRESCRIPTION DRUGS OTHER THAN PRESCRIBED IN THE PAST 12 MONTHS?: NO

## 2024-08-15 SDOH — SOCIAL STABILITY: SOCIAL INSECURITY: VERBAL ABUSE: DENIES

## 2024-08-15 SDOH — HEALTH STABILITY: MENTAL HEALTH: CURRENT SMOKER: 0

## 2024-08-15 SDOH — HEALTH STABILITY: MENTAL HEALTH: SUICIDAL BEHAVIOR (LIFETIME): NO

## 2024-08-15 SDOH — SOCIAL STABILITY: SOCIAL INSECURITY: ARE YOU OR HAVE YOU BEEN THREATENED OR ABUSED PHYSICALLY, EMOTIONALLY, OR SEXUALLY BY ANYONE?: NO

## 2024-08-15 SDOH — HEALTH STABILITY: MENTAL HEALTH: WERE YOU ABLE TO COMPLETE ALL THE BEHAVIORAL HEALTH SCREENINGS?: YES

## 2024-08-15 SDOH — SOCIAL STABILITY: SOCIAL INSECURITY: HAVE YOU HAD ANY THOUGHTS OF HARMING ANYONE ELSE?: NO

## 2024-08-15 SDOH — SOCIAL STABILITY: SOCIAL INSECURITY: HAS ANYONE EVER THREATENED TO HURT YOUR FAMILY OR YOUR PETS?: NO

## 2024-08-15 SDOH — SOCIAL STABILITY: SOCIAL INSECURITY: HAVE YOU HAD THOUGHTS OF HARMING ANYONE ELSE?: NO

## 2024-08-15 SDOH — HEALTH STABILITY: MENTAL HEALTH: NON-SPECIFIC ACTIVE SUICIDAL THOUGHTS (PAST 1 MONTH): NO

## 2024-08-15 SDOH — HEALTH STABILITY: MENTAL HEALTH: HAVE YOU USED ANY SUBSTANCES (CANABIS, COCAINE, HEROIN, HALLUCINOGENS, INHALANTS, ETC.) IN THE PAST 12 MONTHS?: NO

## 2024-08-15 SDOH — SOCIAL STABILITY: SOCIAL INSECURITY: DO YOU FEEL ANYONE HAS EXPLOITED OR TAKEN ADVANTAGE OF YOU FINANCIALLY OR OF YOUR PERSONAL PROPERTY?: NO

## 2024-08-15 SDOH — SOCIAL STABILITY: SOCIAL INSECURITY: PHYSICAL ABUSE: DENIES

## 2024-08-15 SDOH — ECONOMIC STABILITY: HOUSING INSECURITY: DO YOU FEEL UNSAFE GOING BACK TO THE PLACE WHERE YOU ARE LIVING?: NO

## 2024-08-15 SDOH — HEALTH STABILITY: MENTAL HEALTH: STRENGTHS (MUST CHOOSE TWO): SUPPORT FROM ORGANIZED COMMUNITY;SUPPORT FROM FAMILY;SUPPORT FROM FRIENDS

## 2024-08-15 SDOH — SOCIAL STABILITY: SOCIAL INSECURITY: DOES ANYONE TRY TO KEEP YOU FROM HAVING/CONTACTING OTHER FRIENDS OR DOING THINGS OUTSIDE YOUR HOME?: NO

## 2024-08-15 ASSESSMENT — PATIENT HEALTH QUESTIONNAIRE - PHQ9
1. LITTLE INTEREST OR PLEASURE IN DOING THINGS: NOT AT ALL
2. FEELING DOWN, DEPRESSED OR HOPELESS: NOT AT ALL
SUM OF ALL RESPONSES TO PHQ9 QUESTIONS 1 & 2: 0

## 2024-08-15 ASSESSMENT — ACTIVITIES OF DAILY LIVING (ADL): LACK_OF_TRANSPORTATION: NO

## 2024-08-15 ASSESSMENT — LIFESTYLE VARIABLES
HOW OFTEN DO YOU HAVE A DRINK CONTAINING ALCOHOL: NEVER
SKIP TO QUESTIONS 9-10: 1
AUDIT-C TOTAL SCORE: 0
AUDIT-C TOTAL SCORE: 0
HOW OFTEN DO YOU HAVE 6 OR MORE DRINKS ON ONE OCCASION: NEVER
HOW MANY STANDARD DRINKS CONTAINING ALCOHOL DO YOU HAVE ON A TYPICAL DAY: PATIENT DOES NOT DRINK

## 2024-08-15 ASSESSMENT — PAIN SCALES - GENERAL
PAINLEVEL_OUTOF10: 5 - MODERATE PAIN
PAINLEVEL_OUTOF10: 0 - NO PAIN
PAINLEVEL_OUTOF10: 6
PAINLEVEL_OUTOF10: 5 - MODERATE PAIN
PAINLEVEL_OUTOF10: 0 - NO PAIN
PAINLEVEL_OUTOF10: 0 - NO PAIN
PAINLEVEL_OUTOF10: 6
PAINLEVEL_OUTOF10: 0 - NO PAIN
PAINLEVEL_OUTOF10: 0 - NO PAIN

## 2024-08-15 NOTE — H&P
OB Admission H&P    ASSESSMENT & PLAN: Kyung De La Garza is a 23 y.o.  at 39w0d who presents for Induction of Labor      Plan:   -Admit to L&D, consented  -Labs drawn: T&S, CBC, and Syphilis  -Epidural at patient request  -Recheck as clinically indicated by maternal or fetal status  -Plan to initiate induction with CRB and Cytotec     Fetal Status  -NST reactive, reassuring   -Presentation cephalic based on bedside ultrasound  -EFW 2238 by 36w0d U/S  -BMZ: Not Indicated  -1hr 132  -GBS Negative at 36w0d  -NICU Consult: Not Indicated    -Postpartum Contraception Plan: Patient Declined    Subjective   Patient is a 24 yo female  presenting for induction of labor. Partner is at bedside. Patient declined . Fetus with concern for unilateral cleft lip on ultrasound.  Patient denies any further complications. Denies history of STI during pregnancy. Good fetal movement. Denies vaginal bleeding., Denies contractions., Denies leaking of fluid.      Prenatal Provider Ana Sierra MD    Pregnancy Problems (from 24 to present)       No problems associated with this episode.            OB History    Para Term  AB Living   1 0 0 0 0 0   SAB IAB Ectopic Multiple Live Births   0 0 0 0 0      # Outcome Date GA Lbr Vance/2nd Weight Sex Type Anes PTL Lv   1 Current                No past surgical history on file.    Social History     Tobacco Use    Smoking status: Never    Smokeless tobacco: Never   Substance Use Topics    Alcohol use: Not on file       No Known Allergies    Medications Prior to Admission   Medication Sig Dispense Refill Last Dose    prenatal 21/iron fu/folic acid (PRENATAL COMPLETE ORAL) Take by mouth.   2024     Objective     Last Vitals  Temp Pulse Resp BP MAP O2 Sat   36.8 °C (98.2 °F) 93 16 112/69 84 97 %         Physical Exam  General: NAD, mood appropriate  Cardiopulmonary: warm and well perfused, breathing comfortably on room air  Abdomen: Gravid,  non-tender  Extremities: Symmetric without significant edema  Speculum Exam: physiological vaginal discharge    Cervix: Closed /  /       Fetal Monitoring  Baseline: 150 bpm, Variability: Moderate, Accelerations: Present and Decelerations: None  Uterine Activity: No contractions seen on toco  Interpretation: Reactive    Bedside Ultrasound: YES, FindingsCephalic    Labs in chart were reviewed.          Prenatal labs reviewed, not remarkable     Betty Brown MD   Emergency Medicine PGY-1

## 2024-08-15 NOTE — SIGNIFICANT EVENT
AROM    Cervical Exam  Dilation: 3  Effacement (%): 60  Fetal Station: -3  Method: Manual  OB Examiner: Gigi RASMUSSEN  Fetal Assessment  Movement: Present  Mode: External US  Baseline Fetal Heart Rate (bpm): 155 bpm  Baseline Classification: Normal  Variability: Moderate (Between 6 and 25 BPM)  Pattern: Accelerations  FHR Category: Category I  Multiple Births: No      Contraction Frequency: 0.5-3      Fetal head palpated with no other presenting parts. AROM for clear fluid.    Coty Yu MD PGY-2

## 2024-08-15 NOTE — ANESTHESIA PREPROCEDURE EVALUATION
Patient: Kyung De La Garza    Evaluation Method: In-person visit    Procedure Information    Date: 08/15/24  Procedure: Labor Consult         Relevant Problems   Anesthesia (within normal limits)      Cardiac (within normal limits)      Pulmonary   (+) Mild intermittent asthma (HHS-HCC)      Neuro (within normal limits)      GI (within normal limits)      /Renal (within normal limits)      Liver (within normal limits)      Endocrine (within normal limits)      Hematology (within normal limits)       Clinical information reviewed:    Allergies  Meds               NPO Detail:  No data recorded     OB/Gyn Evaluation    Present Pregnancy    Patient is pregnant now.   Obstetric History                Physical Exam    Airway  Mallampati: II  TM distance: >3 FB  Neck ROM: full     Cardiovascular - normal exam  Rhythm: regular  Rate: normal     Dental - normal exam     Pulmonary - normal exam  Breath sounds clear to auscultation     Abdominal            Anesthesia Plan    History of general anesthesia?: yes  History of complications of general anesthesia?: no    ASA 2     epidural     Anesthetic plan and risks discussed with patient.  Use of blood products discussed with patient who consented to blood products.    Plan discussed with resident.

## 2024-08-15 NOTE — PROGRESS NOTES
Intrapartum Progress Note  Pt is a 23 y.o.yo  at 39w0d gestation admitted for term IOL     ASSESSMENT AND PLAN  IOL  - Labor course:  1245 0/0/-3  1400 CRB and cyto#1  1730 CRB out  - will start pitocin and for AROM   - epidural as desired for pain control    Fetal left cleft lip  - for green and purple top genetic testing and for  evaluation    Asthma  - albuterol prn  - will avoid hemabate    GBS ngative      Fetal status  - FHT Cat I   - CEFM    PPBC  - discussed options for postpartum birth control  - Patient declines    Amine Sahmoud, PGY4 OBGYN  Seen and discussed with Dr. Ferguson    SUBJECTIVE    Patient feeling uncomfortable with balloon in place, feeling intermittent contractions  Assessment & Plan  Labor and delivery, indication for care ([facility])    Mild intermittent asthma ([facility])    Pregnancy Problems (from 24 to present)       Problem Noted Resolved    Labor and delivery, indication for care ([facility]) 8/15/2024 by Betty Brown MD No    Priority:  Medium                Objective   Last Vitals:  Temp Pulse Resp BP MAP Pulse Ox   36.2 °C (97.2 °F) 86 16 115/68 87 98 %     Vitals Min/Max Last 24 Hours:  Temp  Min: 36.2 °C (97.2 °F)  Max: 36.8 °C (98.2 °F)  Pulse  Min: 78  Max: 104  Resp  Min: 16  Max: 16  BP  Min: 112/69  Max: 115/68  MAP (mmHg)  Min: 84  Max: 87    Intake/Output:    Intake/Output Summary (Last 24 hours) at 8/15/2024 1754  Last data filed at 8/15/2024 1744  Gross per 24 hour   Intake 766.67 ml   Output --   Net 766.67 ml       Physical Examination:  Cervical Exam  Dilation: Closed  Method: Manual  OB Examiner: Diaz RASMUSSEN  Fetal Assessment  Movement: Present  Mode: External US  Baseline Fetal Heart Rate (bpm): 140 bpm  Baseline Classification: Normal  Variability: Moderate (Between 6 and 25 BPM)  Pattern: Accelerations  FHR Category: Category I  Multiple Births: No      Contraction Frequency: 2.5-4

## 2024-08-15 NOTE — SIGNIFICANT EVENT
"Labor Progress Note    Subjective: Pt resting in bed. Discussed options for induction of labor and recommendation for CRB and cytotec.     Objective:  Vitals: /69   Pulse 104   Temp 36.8 °C (98.2 °F) (Temporal)   Resp 16   Ht 1.651 m (5' 5\")   Wt 88.5 kg (195 lb 1.7 oz)   LMP 11/16/2023 (Approximate)   SpO2 99%   BMI 32.47 kg/m²   SVE: 0/ /   FHT: 150/mod/+accel/-decels  [place]: rare contractions  CRB placed successfully via speculum. Minimal bleeding present due to cervical manipulation. Cytotec placed vaginally following CRB placement.     A/P:  IOL  CRB and cytotec placed  CEFM, currently Category I  Epidural as requested    Fetal left cleft lip  Send purple and green top tubes of cord blood at delivery    Rosa Guzman, M4    Patient seen and evaluated with medical student. Agree with assessment above, edits made within text.  Charo Perales MD PGY-3  "

## 2024-08-16 LAB
BASE EXCESS BLDCOA CALC-SCNC: -4.7 MMOL/L (ref -10.8–-0.5)
BASE EXCESS BLDCOV CALC-SCNC: -4.5 MMOL/L (ref -8.1–-0.5)
BODY TEMPERATURE: 37 DEGREES CELSIUS
BODY TEMPERATURE: 37 DEGREES CELSIUS
HCO3 BLDCOA-SCNC: 25.2 MMOL/L (ref 15–29)
HCO3 BLDCOV-SCNC: 21.7 MMOL/L (ref 16–26)
INHALED O2 CONCENTRATION: 21 %
INHALED O2 CONCENTRATION: 21 %
OXYHGB MFR BLDCOA: 16.1 % (ref 94–98)
OXYHGB MFR BLDCOV: 58.7 % (ref 94–98)
PCO2 BLDCOA: 69 MM HG (ref 31–75)
PCO2 BLDCOV: 43 MM HG (ref 22–53)
PH BLDCOA: 7.17 PH (ref 7.08–7.39)
PH BLDCOV: 7.31 PH (ref 7.19–7.47)
PO2 BLDCOA: 19 MM HG (ref 5–31)
PO2 BLDCOV: 32 MM HG (ref 13–37)
SAO2 % BLDCOA: 16 % (ref 3–69)
SAO2 % BLDCOV: 61 % (ref 16–84)

## 2024-08-16 PROCEDURE — 7210000002 HC LABOR PER HOUR

## 2024-08-16 PROCEDURE — 1100000001 HC PRIVATE ROOM DAILY

## 2024-08-16 PROCEDURE — 82805 BLOOD GASES W/O2 SATURATION: CPT

## 2024-08-16 PROCEDURE — 88307 TISSUE EXAM BY PATHOLOGIST: CPT | Performed by: STUDENT IN AN ORGANIZED HEALTH CARE EDUCATION/TRAINING PROGRAM

## 2024-08-16 PROCEDURE — 59409 OBSTETRICAL CARE: CPT

## 2024-08-16 PROCEDURE — 2500000004 HC RX 250 GENERAL PHARMACY W/ HCPCS (ALT 636 FOR OP/ED)

## 2024-08-16 PROCEDURE — 0HQ9XZZ REPAIR PERINEUM SKIN, EXTERNAL APPROACH: ICD-10-PCS | Performed by: OBSTETRICS & GYNECOLOGY

## 2024-08-16 PROCEDURE — 7100000016 HC LABOR RECOVERY PER HOUR

## 2024-08-16 PROCEDURE — 88307 TISSUE EXAM BY PATHOLOGIST: CPT | Mod: TC,SUR | Performed by: OBSTETRICS & GYNECOLOGY

## 2024-08-16 PROCEDURE — 2500000001 HC RX 250 WO HCPCS SELF ADMINISTERED DRUGS (ALT 637 FOR MEDICARE OP): Performed by: STUDENT IN AN ORGANIZED HEALTH CARE EDUCATION/TRAINING PROGRAM

## 2024-08-16 PROCEDURE — 59409 OBSTETRICAL CARE: CPT | Performed by: OBSTETRICS & GYNECOLOGY

## 2024-08-16 PROCEDURE — 2500000004 HC RX 250 GENERAL PHARMACY W/ HCPCS (ALT 636 FOR OP/ED): Performed by: STUDENT IN AN ORGANIZED HEALTH CARE EDUCATION/TRAINING PROGRAM

## 2024-08-16 RX ORDER — LOPERAMIDE HYDROCHLORIDE 2 MG/1
4 CAPSULE ORAL EVERY 2 HOUR PRN
Status: DISCONTINUED | OUTPATIENT
Start: 2024-08-16 | End: 2024-08-18 | Stop reason: HOSPADM

## 2024-08-16 RX ORDER — POLYETHYLENE GLYCOL 3350 17 G/17G
17 POWDER, FOR SOLUTION ORAL 2 TIMES DAILY PRN
Status: DISCONTINUED | OUTPATIENT
Start: 2024-08-16 | End: 2024-08-18 | Stop reason: HOSPADM

## 2024-08-16 RX ORDER — OXYTOCIN 10 [USP'U]/ML
10 INJECTION, SOLUTION INTRAMUSCULAR; INTRAVENOUS ONCE AS NEEDED
Status: DISCONTINUED | OUTPATIENT
Start: 2024-08-16 | End: 2024-08-18 | Stop reason: HOSPADM

## 2024-08-16 RX ORDER — HYDRALAZINE HYDROCHLORIDE 20 MG/ML
5 INJECTION INTRAMUSCULAR; INTRAVENOUS ONCE AS NEEDED
Status: DISCONTINUED | OUTPATIENT
Start: 2024-08-16 | End: 2024-08-18 | Stop reason: HOSPADM

## 2024-08-16 RX ORDER — ONDANSETRON HYDROCHLORIDE 2 MG/ML
4 INJECTION, SOLUTION INTRAVENOUS EVERY 6 HOURS PRN
Status: DISCONTINUED | OUTPATIENT
Start: 2024-08-16 | End: 2024-08-18 | Stop reason: HOSPADM

## 2024-08-16 RX ORDER — TRANEXAMIC ACID 100 MG/ML
1000 INJECTION, SOLUTION INTRAVENOUS ONCE AS NEEDED
Status: DISCONTINUED | OUTPATIENT
Start: 2024-08-16 | End: 2024-08-18 | Stop reason: HOSPADM

## 2024-08-16 RX ORDER — IBUPROFEN 600 MG/1
600 TABLET ORAL EVERY 6 HOURS SCHEDULED
Status: DISCONTINUED | OUTPATIENT
Start: 2024-08-16 | End: 2024-08-18 | Stop reason: HOSPADM

## 2024-08-16 RX ORDER — AMOXICILLIN 250 MG
2 CAPSULE ORAL NIGHTLY PRN
Status: DISCONTINUED | OUTPATIENT
Start: 2024-08-16 | End: 2024-08-18 | Stop reason: HOSPADM

## 2024-08-16 RX ORDER — MISOPROSTOL 200 UG/1
800 TABLET ORAL ONCE AS NEEDED
Status: DISCONTINUED | OUTPATIENT
Start: 2024-08-16 | End: 2024-08-18 | Stop reason: HOSPADM

## 2024-08-16 RX ORDER — DIPHENHYDRAMINE HYDROCHLORIDE 50 MG/ML
25 INJECTION INTRAMUSCULAR; INTRAVENOUS EVERY 6 HOURS PRN
Status: DISCONTINUED | OUTPATIENT
Start: 2024-08-16 | End: 2024-08-18 | Stop reason: HOSPADM

## 2024-08-16 RX ORDER — METOCLOPRAMIDE HYDROCHLORIDE 5 MG/ML
10 INJECTION INTRAMUSCULAR; INTRAVENOUS EVERY 6 HOURS PRN
Status: DISCONTINUED | OUTPATIENT
Start: 2024-08-16 | End: 2024-08-18 | Stop reason: HOSPADM

## 2024-08-16 RX ORDER — ADHESIVE BANDAGE
10 BANDAGE TOPICAL
Status: DISCONTINUED | OUTPATIENT
Start: 2024-08-16 | End: 2024-08-18 | Stop reason: HOSPADM

## 2024-08-16 RX ORDER — CARBOPROST TROMETHAMINE 250 UG/ML
250 INJECTION, SOLUTION INTRAMUSCULAR ONCE AS NEEDED
Status: DISCONTINUED | OUTPATIENT
Start: 2024-08-16 | End: 2024-08-18 | Stop reason: HOSPADM

## 2024-08-16 RX ORDER — OXYTOCIN/0.9 % SODIUM CHLORIDE 30/500 ML
60 PLASTIC BAG, INJECTION (ML) INTRAVENOUS ONCE AS NEEDED
Status: DISCONTINUED | OUTPATIENT
Start: 2024-08-16 | End: 2024-08-18 | Stop reason: HOSPADM

## 2024-08-16 RX ORDER — DIPHENHYDRAMINE HCL 25 MG
25 CAPSULE ORAL EVERY 6 HOURS PRN
Status: DISCONTINUED | OUTPATIENT
Start: 2024-08-16 | End: 2024-08-18 | Stop reason: HOSPADM

## 2024-08-16 RX ORDER — LABETALOL HYDROCHLORIDE 5 MG/ML
20 INJECTION, SOLUTION INTRAVENOUS ONCE AS NEEDED
Status: DISCONTINUED | OUTPATIENT
Start: 2024-08-16 | End: 2024-08-18 | Stop reason: HOSPADM

## 2024-08-16 RX ORDER — LIDOCAINE 560 MG/1
1 PATCH PERCUTANEOUS; TOPICAL; TRANSDERMAL
Status: DISCONTINUED | OUTPATIENT
Start: 2024-08-16 | End: 2024-08-18 | Stop reason: HOSPADM

## 2024-08-16 RX ORDER — BISACODYL 10 MG/1
10 SUPPOSITORY RECTAL DAILY PRN
Status: DISCONTINUED | OUTPATIENT
Start: 2024-08-16 | End: 2024-08-18 | Stop reason: HOSPADM

## 2024-08-16 RX ORDER — NIFEDIPINE 10 MG/1
10 CAPSULE ORAL ONCE AS NEEDED
Status: DISCONTINUED | OUTPATIENT
Start: 2024-08-16 | End: 2024-08-18 | Stop reason: HOSPADM

## 2024-08-16 RX ORDER — DIPHENHYDRAMINE HYDROCHLORIDE 50 MG/ML
25 INJECTION INTRAMUSCULAR; INTRAVENOUS ONCE
Status: COMPLETED | OUTPATIENT
Start: 2024-08-16 | End: 2024-08-16

## 2024-08-16 RX ORDER — ACETAMINOPHEN 325 MG/1
975 TABLET ORAL EVERY 6 HOURS SCHEDULED
Status: DISCONTINUED | OUTPATIENT
Start: 2024-08-16 | End: 2024-08-18 | Stop reason: HOSPADM

## 2024-08-16 RX ORDER — ONDANSETRON 4 MG/1
4 TABLET, FILM COATED ORAL EVERY 6 HOURS PRN
Status: DISCONTINUED | OUTPATIENT
Start: 2024-08-16 | End: 2024-08-18 | Stop reason: HOSPADM

## 2024-08-16 RX ORDER — METOCLOPRAMIDE 10 MG/1
10 TABLET ORAL EVERY 6 HOURS PRN
Status: DISCONTINUED | OUTPATIENT
Start: 2024-08-16 | End: 2024-08-18 | Stop reason: HOSPADM

## 2024-08-16 RX ORDER — SIMETHICONE 80 MG
80 TABLET,CHEWABLE ORAL 4 TIMES DAILY PRN
Status: DISCONTINUED | OUTPATIENT
Start: 2024-08-16 | End: 2024-08-18 | Stop reason: HOSPADM

## 2024-08-16 RX ORDER — METHYLERGONOVINE MALEATE 0.2 MG/ML
0.2 INJECTION INTRAVENOUS ONCE AS NEEDED
Status: DISCONTINUED | OUTPATIENT
Start: 2024-08-16 | End: 2024-08-18 | Stop reason: HOSPADM

## 2024-08-16 RX ADMIN — ACETAMINOPHEN 975 MG: 325 TABLET ORAL at 21:05

## 2024-08-16 RX ADMIN — IBUPROFEN 600 MG: 600 TABLET, FILM COATED ORAL at 08:19

## 2024-08-16 RX ADMIN — DIPHENHYDRAMINE HYDROCHLORIDE 25 MG: 50 INJECTION INTRAMUSCULAR; INTRAVENOUS at 00:38

## 2024-08-16 RX ADMIN — IBUPROFEN 600 MG: 600 TABLET, FILM COATED ORAL at 15:18

## 2024-08-16 RX ADMIN — Medication 60 MILLI-UNITS/MIN: at 05:55

## 2024-08-16 RX ADMIN — IBUPROFEN 600 MG: 600 TABLET, FILM COATED ORAL at 21:05

## 2024-08-16 RX ADMIN — ACETAMINOPHEN 975 MG: 325 TABLET ORAL at 15:18

## 2024-08-16 RX ADMIN — BENZOCAINE AND LEVOMENTHOL 1 APPLICATION: 200; 5 SPRAY TOPICAL at 08:45

## 2024-08-16 RX ADMIN — METHYLERGONOVINE MALEATE 0.2 MG: 0.2 INJECTION, SOLUTION INTRAMUSCULAR; INTRAVENOUS at 05:39

## 2024-08-16 RX ADMIN — Medication 2 MILLI-UNITS/MIN: at 03:00

## 2024-08-16 RX ADMIN — ACETAMINOPHEN 975 MG: 325 TABLET ORAL at 08:20

## 2024-08-16 ASSESSMENT — PAIN SCALES - GENERAL
PAINLEVEL_OUTOF10: 2
PAINLEVEL_OUTOF10: 8
PAINLEVEL_OUTOF10: 4
PAINLEVEL_OUTOF10: 0 - NO PAIN
PAINLEVEL_OUTOF10: 0 - NO PAIN

## 2024-08-16 ASSESSMENT — PAIN DESCRIPTION - LOCATION: LOCATION: PERINEUM

## 2024-08-16 NOTE — SIGNIFICANT EVENT
Notified of persistent trickle and extra gush of blood. Bimanual performed. 30 ml clot removed. Good uterine tone. Improvement in bleeding.    Will continue to monitor    Coty Yu MD PGY-2

## 2024-08-16 NOTE — LACTATION NOTE
This note was copied from a baby's chart.  Lactation Consultant Note  Lactation Consultation  Reason for Consult: Initial assessment  Consultant Name: KANIKA Abdalla, RN IBCLC    Maternal Information  Has mother  before?: No  Infant to breast within first 2 hours of birth?: No  Breastfeeding Delayed Due to: Infant status  Exclusive Pump and Bottle Feed:  (mother is pumping to establish her milk supply; will attempt to latch directly to the breast)  [facility] Program: Yes    Maternal Assessment  Breast Assessment: Medium, Symmetrical, Soft, Compressible  Nipple Assessment: Intact, Inverted centrally  [place] Assessment: Normal    Infant Assessment  Infant Behavior: Sleepy  Infant Assessment: Palate - high/arch/bubble/normal, Good cupping of tongue, Tongue protrudes over alveolar ridge, Other (Comment) (cleft lip)    Feeding Assessment  Nutrition Source: Formula (medically indicated)  Feeding Method:  (Dr. Batista bottle)    LATCH TOOL       Breast Pump  Pump: Hospital grade electric pump, Double breast pumping  Frequency: 8-10 times per day  Duration: 15-20 minutes per session  Breast Shield Size and Type: 24 mm    Other OB Lactation Tools       Patient Follow-up  Inpatient Lactation Follow-up Needed : Yes    Other OB Lactation Documentation  Maternal Risk Factors: Flat or inverted nipple tissue  Infant Risk Factors: Early term birth 37-39 weeks, Prelacteal feeds, Poor or painful latch / restricted feedings    Recommendations/Summary    Infant was 9 hours old at the time of this visit. The mother has been pumping to establish her milk supply and feeding formula via Deb Brown bottle due to the infant's cleft lip and the mother's inverted nipples. The mother states the she occasionally attempts to latch her infant to the breast, but the infant is not able to grasp the nipple. We discussed following up with outpatient lactation services with assistance with latching, possibly with use of a nipple shield, after her milk  supply is established.The infant had recently had formula via bottle and was sleeping quietly during my visit. She roused briefly and an oral assessment was performed. She is able to extend her tongue well and easily grasps and sucks on the gloved finger. There was no attempt to put the infant to the breast during my visit. The mother was set up for pumping by her nurse upon arrival to the mother/baby.  We reviewed early milk production, breast pump operation, pumping frequency and duration, guidelines for safe breast milk storage, and cleaning/sterilization of breast pump parts. The mother was given written information on outpatient lactation services, including breastfeeding support group at [facility]. The mother does not have a breast pump for home. She is in the process of applying for health insurance at this time. The mother was given a [place] manual pump for home. I suggested that she contact [facility] for assistance in obtaining a breast pump. The mother's questions were answered and she is encouraged to call for assistance as needed.

## 2024-08-16 NOTE — SIGNIFICANT EVENT
Patient comfortable with epidural, requesting SVE    Cervical Exam  Dilation: 4  Effacement (%): 70  Fetal Station: -3  Method: Manual  OB Examiner: Gigi RASMUSSEN  Fetal Assessment  Movement: Present  Mode: External US  Baseline Fetal Heart Rate (bpm): 155 bpm  Baseline Classification: Normal  Variability: Moderate (Between 6 and 25 BPM) (moderate varability with periods of minimal varability)  Pattern: Accelerations, Variable decelerations  Pattern Observations: patient up to bathroom  FHR Category: Category I  Multiple Births: No      Contraction Frequency: 1-5      Latent labor  -s/p arom  -currently jaciel q2-3 min. Will start pitocin when ctx space  -gbs neg,  -CEFM cat 1 currently  -epidural infusing    Coty Yu MD PGY-2

## 2024-08-16 NOTE — ANESTHESIA PREPROCEDURE EVALUATION
Patient: Kyung De La Garza    Evaluation Method: In-person visit    Procedure Information    Date: 08/15/24  Procedure: Labor Consult     23 y.o.  at 39w0d who presents for Induction of Labor     Relevant Problems   Anesthesia (within normal limits)      Cardiac (within normal limits)      Pulmonary   (+) Mild intermittent asthma (HHS-HCC)      Neuro (within normal limits)      GI (within normal limits)      /Renal (within normal limits)      Liver (within normal limits)      Endocrine (within normal limits)      Hematology (within normal limits)     Vitals:    08/15/24 2228   BP:    Pulse: 79   Resp:    Temp:    SpO2: 98%       No past surgical history on file.  No past medical history on file.    Current Facility-Administered Medications:     carboprost (Hemabate) injection 250 mcg, 250 mcg, intramuscular, Once PRN, Betty Brown MD    fentaNYL (PF)-BUPivacaine - Epi 1.25 mcg/mL- 0.044 % epidural infusion, 0-54 mL/hr, epidural, Continuous, Johnathan Cabello DO, Last Rate: 14 mL/hr at 08/15/24 2218, 14 mL/hr at 08/15/24 2218    hydrALAZINE (Apresoline) injection 5 mg, 5 mg, intravenous, Once PRN, Betty Brown MD    labetaloL (Normodyne,Trandate) injection 20 mg, 20 mg, intravenous, Once PRN, Betty Brown MD    lactated Ringer's bolus 500 mL, 500 mL, intravenous, Once PRN, Betty Brown MD    lactated Ringer's infusion, 125 mL/hr, intravenous, Continuous, Betty Brown MD, Last Rate: 125 mL/hr at 08/15/24 1931, 125 mL/hr at 08/15/24 1931    levonorgestreL (LILETTA) IUD 52 mg, 1 each, intrauterine, Once PRN, Betty Brown MD    lidocaine (Xylocaine) 10 mg/mL (1 %) injection 30 mL, 30 mL, subcutaneous, Once PRN, Betty Brown MD    loperamide (Imodium) capsule 4 mg, 4 mg, oral, q2h PRN, Betty Brown MD    methylergonovine (Methergine) injection 0.2 mg, 0.2 mg, intramuscular, Once PRN, Betty Brown MD    metoclopramide (Reglan) tablet 10 mg, 10 mg, oral, q6h PRN **OR** metoclopramide (Reglan) injection 10 mg,  10 mg, intravenous, q6h PRN, Betty Brown MD    miSOPROStol (Cytotec) split tablet 25 mcg, 25 mcg, vaginal, q3h, Charo Perales MD, 25 mcg at 08/15/24 1358    miSOPROStoL (Cytotec) tablet 800 mcg, 800 mcg, rectal, Once PRN, Betty Brown MD    NIFEdipine (Procardia) capsule 10 mg, 10 mg, oral, Once PRN, Betty Brown MD    ondansetron (Zofran) tablet 4 mg, 4 mg, oral, q6h PRN **OR** ondansetron (Zofran) injection 4 mg, 4 mg, intravenous, q6h PRN, Betty Brown MD    oxytocin (Pitocin) bolus from bag, 600 agnieszka-units/min, intravenous, Once PRN, Betty Brown MD    oxytocin (Pitocin) bolus from bag, 600 agnieszka-units/min, intravenous, Once PRN, Betty Brown MD    oxytocin (Pitocin) infusion in sodium chloride 0.9% 30 units/500 mL, 60 agnieszka-units/min, intravenous, Once PRN, Betty Brown MD    oxytocin (Pitocin) infusion in sodium chloride 0.9% 30 units/500 mL, 2-30 agnieszka-units/min, intravenous, Continuous, Amine MD Peter    oxytocin (Pitocin) injection 10 Units, 10 Units, intramuscular, Once PRN, Betty Brown MD    oxytocin (Pitocin) injection 10 Units, 10 Units, intramuscular, Once PRN, Betty Brown MD    terbutaline (Brethine) injection 0.25 mg, 0.25 mg, subcutaneous, Once PRN, Betty Brown MD    tranexamic acid (Cyklokapron) injection 1,000 mg, 1,000 mg, intravenous, Once PRN, Betty Brown MD    Facility-Administered Medications Ordered in Other Encounters:     fentaNYL (PF)-BUPivacaine - Epi 5 mcg/mL- 0.125 % epidural syringe, , epidural, PRN, Chidi Cabello, CAA, 5 mL at 08/15/24 2214  Prior to Admission medications    Medication Sig Start Date End Date Taking? Authorizing Provider   prenatal 21/iron fu/folic acid (PRENATAL COMPLETE ORAL) Take by mouth.   Yes Historical Provider, MD     No Known Allergies  Social History     Tobacco Use    Smoking status: Never    Smokeless tobacco: Never   Substance Use Topics    Alcohol use: Not on file         Chemistry    Lab Results   Component Value Date/Time      06/16/2024 2123    K 3.1 (L) 06/16/2024 2123     06/16/2024 2123    CO2 21 (L) 06/16/2024 2123    BUN 6 (L) 06/16/2024 2123    CREATININE 0.40 06/16/2024 2123    Lab Results   Component Value Date/Time    CALCIUM 8.7 06/16/2024 2123    ALKPHOS 123 06/16/2024 2123    AST 40 06/16/2024 2123    ALT 21 06/16/2024 2123    BILITOT 0.3 06/16/2024 2123          Lab Results   Component Value Date/Time    WBC 10.1 08/15/2024 1132    HGB 12.5 08/15/2024 1132    HCT 36.8 08/15/2024 1132     08/15/2024 1132           Clinical information reviewed:    Allergies  Meds               NPO Detail:  No data recorded     OB/Gyn Evaluation    Present Pregnancy    Patient is pregnant now.   Obstetric History                Physical Exam    Airway  Mallampati: II  TM distance: >3 FB  Neck ROM: full     Cardiovascular - normal exam  Rhythm: regular  Rate: normal     Dental - normal exam     Pulmonary - normal exam  Breath sounds clear to auscultation     Abdominal            Anesthesia Plan    History of general anesthesia?: no  History of complications of general anesthesia?: no    ASA 2     epidural   (Epidural for labor analgesia)  The patient is not a current smoker.    Anesthetic plan and risks discussed with patient.  Use of blood products discussed with patient who consented to blood products.    Plan discussed with CAA and attending.

## 2024-08-16 NOTE — DISCHARGE INSTRUCTIONS

## 2024-08-16 NOTE — ANESTHESIA PROCEDURE NOTES
Epidural Block    Patient location during procedure: OB  Start time: 8/15/2024 9:22 PM  End time: 8/15/2024 10:35 PM  Reason for block: labor analgesia  Staffing  Performed: TITO   Authorized by: Johnathan Cabello DO    Performed by: TITO Tenorio    Preanesthetic Checklist  Completed: patient identified, IV checked, risks and benefits discussed, surgical consent, pre-op evaluation, timeout performed and sterile techniques followed  Block Timeout  RN/Licensed healthcare professional reads aloud to the Anesthesia provider and entire team: Patient identity, procedure with side and site, patient position, and as applicable the availability of implants/special equipment/special requirements.  Patient on coagulant treatment: no  Timeout performed at: 8/15/2024 9:22 PM  Block Placement  Patient position: sitting  Prep: ChloraPrep  Sterility prep: cap, drape, gloves, hand and mask  Sedation level: no sedation  Patient monitoring: blood pressure, continuous pulse oximetry and heart rate  Approach: midline  Local numbing: lidocaine 1% to skin and subcutaneous tissues  Vertebral space: lumbar  Lumbar location: L3-L4  Epidural  Loss of resistance technique: saline  Guidance: landmark technique        Needle  Needle type: Tuohy   Needle gauge: 17  Needle length: 8.9cm  Needle insertion depth: 5 cm  Catheter type: multi-orifice  Catheter size: 19 G  Catheter at skin depth: 10 cm  Catheter securement method: clear occlusive dressing    Test dose: lidocaine 1.5% with epinephrine 1-to-200,000  Test dose: lidocaine 1.5% with epinephrine 1-to-200,000    PCEA  Medication concentration used: 0.044% Bupivacaine with 1.25 mcg/mL Fentanyl and [id number] Epinephrine  Dose (mL): 10  Lockout (minutes): 15  1-Hour Limit (boluses/hr): 4  Basal Rate: 14        Assessment  Sensory level: T10  Block outcome: patient comfortable  Number of attempts: 3 or more  Procedure assessment: patient tolerated procedure well with no immediate  complications  Additional Notes  Cooperative patient, first attempt by student, second by TITO, third by Dr Cabello, much troubleshooting.

## 2024-08-16 NOTE — L&D DELIVERY NOTE
OB Delivery Note  2024  Kyung De La Garza  23 y.o.   Vaginal, Spontaneous       Gestational Age: 39w1d  /Para:   Quantitative Blood Loss: Admission to Discharge: 221 mL (8/15/2024 10:59 AM - 2024  7:19 AM)    . Loose nuchal cord x1, reduced. Placenta delivered spontaneously. Extra green/purple tops of cord blood collected. 1st degree lac - repaired. Fundus firm but persistent trickle. Methergine given. Improvement in bleeding.     Kyung De La Garza [[id number]]      Labor Events    Sac identifier: Sac 1  Rupture date/time: 8/15/2024 1848  Rupture type: Artificial  Fluid color: Clear  Fluid odor: None  Labor type: Induced Onset of Labor  Labor allowed to proceed with plans for an attempted vaginal birth?: Yes  Induction: AROM, Misoprostol  First cervical ripening date/time: 8/15/2024 1358  Induction date/time: 8/15/2024 1059  Induction indications: Fetal Abnormality  Complications: None       Labor Event Times    Dilation complete date/time: 2024 0416  Start pushing date/time: 2024 0419       Labor Length    2nd stage: 1h 09m  3rd stage: 0h 03m       Placenta    Placenta delivery date/time: 2024 0528  Placenta removal: Spontaneous  Placenta appearance: Intact  Placenta disposition: pathology       Cord    Vessels: 3 vessels  Complications: Nuchal  Nuchal intervention: reduced  Nuchal cord description: loose nuchal cord  Delayed cord clamping?: Yes  Cord blood disposition: Refrigerator  Gases sent?: Yes  Stem cell collection (by provider): No       Lacerations    Episiotomy: None  Perineal laceration: 1st  Perineal laceration repaired?: Yes  Other lacerations?: No  Repair suture: None       Anesthesia    Method: Epidural       Operative Delivery    Forceps attempted?: No  Vacuum extractor attempted?: No       Shoulder Dystocia    Shoulder dystocia present?: No       [place] Delivery    Time head delivered: 2024 05:25:00  Birth date/time: 2024  05:25:00  Delivery type: Vaginal, Spontaneous  Complications: None       Resuscitation    Method: Suctioning, Tactile stimulation       Apgars    Living status: Living  Apgar Component Scores:  1 min.:  5 min.:  10 min.:  15 min.:  20 min.:    Skin color:  1  1       Heart rate:  2  2       Reflex irritability:  2  2       Muscle tone:  2  2       Respiratory effort:  2  2       Total:  9  9       Apgars assigned by: MIRANDA BUSH RN       Delivery Providers    Delivering clinician: Rianna Ferguson MD   Provider Role    Tanja Cooper RN Delivery Nurse    Estella Bush RN Nursery Nurse    Coty Yu MD Resident                     Coty Yu MD

## 2024-08-17 PROCEDURE — 2500000001 HC RX 250 WO HCPCS SELF ADMINISTERED DRUGS (ALT 637 FOR MEDICARE OP): Performed by: STUDENT IN AN ORGANIZED HEALTH CARE EDUCATION/TRAINING PROGRAM

## 2024-08-17 PROCEDURE — 1100000001 HC PRIVATE ROOM DAILY

## 2024-08-17 RX ORDER — CYCLOBENZAPRINE HCL 10 MG
5 TABLET ORAL 3 TIMES DAILY
Status: DISCONTINUED | OUTPATIENT
Start: 2024-08-17 | End: 2024-08-18 | Stop reason: HOSPADM

## 2024-08-17 RX ADMIN — IBUPROFEN 600 MG: 600 TABLET, FILM COATED ORAL at 03:12

## 2024-08-17 RX ADMIN — ACETAMINOPHEN 975 MG: 325 TABLET ORAL at 08:47

## 2024-08-17 RX ADMIN — ACETAMINOPHEN 975 MG: 325 TABLET ORAL at 03:13

## 2024-08-17 RX ADMIN — IBUPROFEN 600 MG: 600 TABLET, FILM COATED ORAL at 14:53

## 2024-08-17 RX ADMIN — IBUPROFEN 600 MG: 600 TABLET, FILM COATED ORAL at 08:46

## 2024-08-17 RX ADMIN — ACETAMINOPHEN 975 MG: 325 TABLET ORAL at 20:42

## 2024-08-17 RX ADMIN — IBUPROFEN 600 MG: 600 TABLET, FILM COATED ORAL at 20:41

## 2024-08-17 RX ADMIN — ACETAMINOPHEN 975 MG: 325 TABLET ORAL at 14:52

## 2024-08-17 ASSESSMENT — PAIN SCALES - GENERAL
PAINLEVEL_OUTOF10: 5 - MODERATE PAIN
PAINLEVEL_OUTOF10: 3
PAINLEVEL_OUTOF10: 5 - MODERATE PAIN
PAINLEVEL_OUTOF10: 4
PAINLEVEL_OUTOF10: 7

## 2024-08-17 ASSESSMENT — PAIN DESCRIPTION - LOCATION: LOCATION: BACK

## 2024-08-17 NOTE — LACTATION NOTE
Lactation Consultant Note  Lactation Consultation  Reason for Consult: Follow-up assessment  Consultant Name: Janet Coughlin RN IBCLC    Maternal Information  Has mother  before?: No  Infant to breast within first 2 hours of birth?: Yes  Exclusive Pump and Bottle Feed: Yes    Maternal Assessment  Breast Assessment: Medium, Symmetrical  Nipple Assessment: Intact, Inverted    Infant Assessment  Infant Behavior: Sleepy  Infant Assessment: Good cupping of tongue, Good lateral movement of tongue, Able to elevate tongue to roof of mouth, Other (Comment) (Cleft lip)    Feeding Assessment  Nutrition Source: Breastmilk, Formula (medically indicated)  Feeding Method: Feeding expressed breastmilk, Paced bottle    Breast Pump  Pump: [employer] grade electric pump  Frequency: 8-10 times per day  Duration: Initiate phase    Other OB Lactation Tools  Lactation Tools: Flanges    Patient Follow-up  Inpatient Lactation Follow-up Needed : Yes  Outpatient Lactation Follow-up: Recommended    Other OB Lactation Documentation  Infant Risk Factors: Other (comment) (cleft lip)    Recommendations/Summary  Mom is exclusively pumping for her baby who has a cleft lip. Baby has a good suck but mom's nipples are inverted, so it is difficult for her to get an adequate seal at the breast. Mom's plan is to exclusively pump and she will work with outpatient lactation in the future if she decides she wants to work on latching. Mom is supplementing with formula while we wait for her milk to fully come in. Mom is producing drops of colostrum now with pumping and has been pumping both breasts consistently every 3 hours for 15 mins at a time on initiate mode. Discussed expected pumped milk volumes over the first several days postpartum. Mom does not have insurance yet (worked with social work yesterday to start the process) and does not have a pump for at home. She is a member of [employer], so I rented her a [employer] pump for one month. Instructed mom to return  the pump to [facility] on or before 9/17/24. By this point, mom's insurance should be established. I gave her the outpatient lactation information and encouraged her to call them for assistance with ordering a pump for home once she has insurance. Mom expressed understanding. Mom has no further questions/concerns at this time.

## 2024-08-17 NOTE — CARE PLAN
Problem: Postpartum  Goal: Experiences normal postpartum course  Outcome: Progressing  Goal: Appropriate maternal -  bonding  Outcome: Progressing  Goal: Establish and maintain infant feeding pattern for adequate nutrition  Outcome: Progressing  Goal: No s/sx infection  Outcome: Progressing  Goal: No s/sx of hemorrhage  Outcome: Progressing  Goal: Minimal s/sx of HDP and BP<160/110  Outcome: Progressing

## 2024-08-17 NOTE — PROGRESS NOTES
Postpartum Progress Note    Assessment/Plan   Kyung De La Garza is a 23 y.o., , who was admitted for term IOL, delivered at 39w1d gestation and is now postpartum day 1 s/p .     Routine postpartum care  - meeting all milestones  - 1st degree laceration s/p repair,  mL  - bonding with female infant  - pain well controlled on po medications  - DVT Score: 3 - encourage ambulation and  SCDs  - A+, Rhogam not indicated  - admission hgb: 12.5  - PPBC: declines    Maternal Well-Being  - emotional support provided    [place] Feeding  - breastfeeding/pumping encouraged; lactation consult prn    Dispo:  anticipate d/c on PPD #2 if meeting all postpartum milestones, for f/u 1 month with Primary OB provider    INDERJIT Watters    Principal Problem:     (normal spontaneous vaginal delivery) ([facility])  Active Problems:    Mild intermittent asthma ([facility])    Pregnancy Problems (from 24 to present)       Problem Noted Resolved     (normal spontaneous vaginal delivery) ([facility]) 8/15/2024 by Betty Brown MD No    Priority:  Medium              Subjective   Her pain is well controlled with current medications  She is passing flatus  She is ambulating well  She is tolerating a Adult diet Regular  She reports no breast or nursing problems  She denies emotional concerns today     Objective   Allergies:   Patient has no known allergies.         Last Vitals:  Temp Pulse Resp BP MAP Pulse Ox   36 °C (96.8 °F) 80 16 109/71   97 %     Vitals Min/Max Last 24 Hours:  Temp  Min: 36 °C (96.8 °F)  Max: 36.8 °C (98.2 °F)  Pulse  Min: 75  Max: 97  Resp  Min: 16  Max: 20  BP  Min: 97/65  Max: 109/71    Intake/Output:   No intake or output data in the 24 hours ending 24 1052    Physical Exam:  General: well appearing, well-nourished, postpartum  Obstetric: abdomen soft/non-tender, fundus firm below umbilicus, lochia light  Skin: No rashes/lesions/erythema  Neuro: A/Ox3, conversational  GI:  +flatus  Respiratory: Even and unlabored on RA  Extremities: No edema, discoloration, or pain in BLE, equal and palpable DP and PT pulses    Psych: appropriate mood and affect     Lab Data:  Lab Results   Component Value Date    WBC 10.1 08/15/2024    HGB 12.5 08/15/2024    HCT 36.8 08/15/2024     08/15/2024

## 2024-08-17 NOTE — ANESTHESIA POSTPROCEDURE EVALUATION
Kyung De La Garza is a 23 y.o., , who had a Vaginal, Spontaneous delivery on 2024 at 39w1d and is now POD1.    She had Neuraxial Anesthesia without immediate complications noted.       Pain well controlled    Vitals:    24 0708   BP: 109/71   Pulse: 80   Resp: 16   Temp: 36 °C (96.8 °F)   SpO2: 97%       Neuraxial site assessed. No visible redness or swelling or drainage. Patient able to ambulate and move all extremities without difficulty. Able to void. No complaints of nausea/vomiting. Tolerating PO intake well. No s/sx of PDPH.     Anesthesia will sign off     Rm Corrales MD

## 2024-08-18 VITALS
DIASTOLIC BLOOD PRESSURE: 66 MMHG | HEART RATE: 76 BPM | RESPIRATION RATE: 18 BRPM | WEIGHT: 195.11 LBS | OXYGEN SATURATION: 97 % | SYSTOLIC BLOOD PRESSURE: 102 MMHG | HEIGHT: 65 IN | BODY MASS INDEX: 32.51 KG/M2 | TEMPERATURE: 98.1 F

## 2024-08-18 PROCEDURE — 2500000001 HC RX 250 WO HCPCS SELF ADMINISTERED DRUGS (ALT 637 FOR MEDICARE OP): Performed by: STUDENT IN AN ORGANIZED HEALTH CARE EDUCATION/TRAINING PROGRAM

## 2024-08-18 PROCEDURE — 2500000001 HC RX 250 WO HCPCS SELF ADMINISTERED DRUGS (ALT 637 FOR MEDICARE OP)

## 2024-08-18 PROCEDURE — 99238 HOSP IP/OBS DSCHRG MGMT 30/<: CPT

## 2024-08-18 RX ADMIN — ACETAMINOPHEN 975 MG: 325 TABLET ORAL at 09:03

## 2024-08-18 RX ADMIN — CYCLOBENZAPRINE 5 MG: 10 TABLET, FILM COATED ORAL at 09:03

## 2024-08-18 RX ADMIN — IBUPROFEN 600 MG: 600 TABLET, FILM COATED ORAL at 09:03

## 2024-08-18 RX ADMIN — IBUPROFEN 600 MG: 600 TABLET, FILM COATED ORAL at 03:09

## 2024-08-18 RX ADMIN — ACETAMINOPHEN 975 MG: 325 TABLET ORAL at 03:08

## 2024-08-18 ASSESSMENT — PAIN SCALES - GENERAL
PAINLEVEL_OUTOF10: 4
PAINLEVEL_OUTOF10: 5 - MODERATE PAIN

## 2024-08-18 ASSESSMENT — PAIN DESCRIPTION - LOCATION: LOCATION: BACK

## 2024-08-18 NOTE — CARE PLAN
Problem: Postpartum  Goal: Experiences normal postpartum course  Outcome: Progressing     Problem: Postpartum  Goal: Appropriate maternal -  bonding  Outcome: Progressing     Problem: Postpartum  Goal: Establish and maintain infant feeding pattern for adequate nutrition  Outcome: Progressing     Problem: Postpartum  Goal: No s/sx infection  Outcome: Progressing     Problem: Postpartum  Goal: No s/sx of hemorrhage  Outcome: Progressing     Problem: Postpartum  Goal: Minimal s/sx of HDP and BP<160/110  Outcome: Progressing

## 2024-08-18 NOTE — LACTATION NOTE
Lactation Consultant Note  Lactation Consultation       Maternal Information       Maternal Assessment       Infant Assessment       Feeding Assessment       LATCH TOOL       Breast Pump       Other OB Lactation Tools       Patient Follow-up       Other OB Lactation Documentation       Recommendations/Summary  Mom exclusively pumping - [employer] loaner given to patient per Lactation yesterday.  Dad/ Mom presented the WIC card today to secure borrowing the pump.     WIC pump [facility] #[id number] loaned until 9/18/24    [facility] # [phone]    Encouraged mom to pump every 3 hours (or 8-12 times in a 24 hour period for 20 minutes on both breasts.     Mom has outpatient lactation resources     Denies any questions at this time.

## 2024-08-18 NOTE — DISCHARGE SUMMARY
"Discharge Summary    Kyung De La Garza is a 23 y.o. year old female , who was admitted for term IOL, delivered at 39w1d gestation and is now postpartum day 2 s/p  c/b 1st degree laceration s/p repair.  Admission Date: 8/15/2024  Discharge Date: 24       Discharge Diagnosis  Spontaneous vaginal delivery    Hospital Course  Delivery Date: 2024 5:25 AM  Delivery type: Vaginal, Spontaneous   GA at delivery: 39w1d   Outcome: Living  Intrapartum complications: None  Feeding method: Breastfeeding Status: Yes     Back pain and upper abdominal pain with N/V night of PPD1 now resolved. Patient thinks it was due to something she ate.     Procedures: none  Contraception at discharge: Declines  Defers contraception to primary OB/PP visit. We discussed pregnancy spacing of at least one year, abstaining from intercourse for 6wks, and the ability to become pregnant in the absence of regular menses. Pt verbalized understanding.    Meeting all postpartum milestones. OK for DC today and follow up as below.     Pertinent Physical Exam At Time of Discharge    General: Examination reveals a well developed, well nourished, female, in no acute distress. She is alert and cooperative.  Lungs: symmetrical, non-labored breathing.  Cardiac: warm, well-perfused.  Abdomen: soft, non-tender.  Fundus: firm, below umbilicus, and nontender.  Extremities: no redness or tenderness in the calves or thighs.  Neurological: alert, oriented, normal speech, no focal findings or movement disorder noted.     Vitals  /66   Pulse 76   Temp 36.7 °C (98.1 °F) (Temporal)   Resp 18   Ht 1.651 m (5' 5\")   Wt 88.5 kg (195 lb 1.7 oz)   LMP 2023 (Approximate)   SpO2 97%   Breastfeeding Yes   BMI 32.47 kg/m²      Discharge Meds     Your medication list        CONTINUE taking these medications        Instructions Last Dose Given Next Dose Due   PRENATAL COMPLETE ORAL                     Complications Requiring " Follow-Up  None    Test Results Pending At Discharge  Pending Labs       Order Current Status    Maternal Cell Contamination Study In process    Surgical Pathology Exam - [facility] In process     [facility]; Unknown/Not Listed - Miscellaneous Genetics Test In process            Outpatient Follow-Up  Follow up with your primary OB in 4-6 weeks for postpartum visit    I spent 20 minutes in the professional and overall care of this patient.      Rosa Hampton, APRN-CNP

## 2024-08-22 LAB
LABORATORY COMMENT REPORT: NORMAL
PATH REPORT.FINAL DX SPEC: NORMAL
PATH REPORT.GROSS SPEC: NORMAL
PATH REPORT.RELEVANT HX SPEC: NORMAL
PATH REPORT.TOTAL CANCER: NORMAL